# Patient Record
Sex: FEMALE | Race: WHITE | Employment: FULL TIME | URBAN - METROPOLITAN AREA
[De-identification: names, ages, dates, MRNs, and addresses within clinical notes are randomized per-mention and may not be internally consistent; named-entity substitution may affect disease eponyms.]

---

## 2022-03-02 LAB
RHEUMATOID FACTOR: 89.2
SEDIMENTATION RATE, ERYTHROCYTE: 10
URIC ACID: 2.5

## 2022-06-22 LAB
C-REACTIVE PROTEIN: 5
SEDIMENTATION RATE, ERYTHROCYTE: 11

## 2022-08-23 ENCOUNTER — OFFICE VISIT (OUTPATIENT)
Dept: RHEUMATOLOGY | Age: 67
End: 2022-08-23
Payer: COMMERCIAL

## 2022-08-23 ENCOUNTER — NURSE ONLY (OUTPATIENT)
Dept: LAB | Age: 67
End: 2022-08-23

## 2022-08-23 VITALS
HEIGHT: 64 IN | OXYGEN SATURATION: 98 % | DIASTOLIC BLOOD PRESSURE: 78 MMHG | SYSTOLIC BLOOD PRESSURE: 116 MMHG | HEART RATE: 94 BPM | BODY MASS INDEX: 22.94 KG/M2 | WEIGHT: 134.4 LBS

## 2022-08-23 DIAGNOSIS — M05.9 SEROPOSITIVE RHEUMATOID ARTHRITIS (HCC): ICD-10-CM

## 2022-08-23 DIAGNOSIS — Z79.899 ENCOUNTER FOR LONG-TERM (CURRENT) USE OF HIGH-RISK MEDICATION: ICD-10-CM

## 2022-08-23 DIAGNOSIS — M05.9 SEROPOSITIVE RHEUMATOID ARTHRITIS (HCC): Primary | ICD-10-CM

## 2022-08-23 LAB
ALBUMIN SERPL-MCNC: 4.5 G/DL (ref 3.5–5.1)
ALP BLD-CCNC: 125 U/L (ref 38–126)
ALT SERPL-CCNC: 25 U/L (ref 11–66)
ANION GAP SERPL CALCULATED.3IONS-SCNC: 12 MEQ/L (ref 8–16)
AST SERPL-CCNC: 30 U/L (ref 5–40)
BASOPHILS # BLD: 0.7 %
BASOPHILS ABSOLUTE: 0.1 THOU/MM3 (ref 0–0.1)
BILIRUB SERPL-MCNC: 0.3 MG/DL (ref 0.3–1.2)
BUN BLDV-MCNC: 7 MG/DL (ref 7–22)
C-REACTIVE PROTEIN: 0.77 MG/DL (ref 0–1)
CALCIUM SERPL-MCNC: 10 MG/DL (ref 8.5–10.5)
CHLORIDE BLD-SCNC: 101 MEQ/L (ref 98–111)
CO2: 25 MEQ/L (ref 23–33)
CREAT SERPL-MCNC: 0.4 MG/DL (ref 0.4–1.2)
EOSINOPHIL # BLD: 0.8 %
EOSINOPHILS ABSOLUTE: 0.1 THOU/MM3 (ref 0–0.4)
ERYTHROCYTE [DISTWIDTH] IN BLOOD BY AUTOMATED COUNT: 13.2 % (ref 11.5–14.5)
ERYTHROCYTE [DISTWIDTH] IN BLOOD BY AUTOMATED COUNT: 43.1 FL (ref 35–45)
GFR SERPL CREATININE-BSD FRML MDRD: > 90 ML/MIN/1.73M2
GLUCOSE BLD-MCNC: 90 MG/DL (ref 70–108)
HCT VFR BLD CALC: 40.5 % (ref 37–47)
HEMOGLOBIN: 12.9 GM/DL (ref 12–16)
IMMATURE GRANS (ABS): 0.08 THOU/MM3 (ref 0–0.07)
IMMATURE GRANULOCYTES: 0.9 %
LYMPHOCYTES # BLD: 11.1 %
LYMPHOCYTES ABSOLUTE: 1 THOU/MM3 (ref 1–4.8)
MCH RBC QN AUTO: 28.5 PG (ref 26–33)
MCHC RBC AUTO-ENTMCNC: 31.9 GM/DL (ref 32.2–35.5)
MCV RBC AUTO: 89.4 FL (ref 81–99)
MONOCYTES # BLD: 10.5 %
MONOCYTES ABSOLUTE: 0.9 THOU/MM3 (ref 0.4–1.3)
NUCLEATED RED BLOOD CELLS: 0 /100 WBC
PLATELET # BLD: 401 THOU/MM3 (ref 130–400)
PMV BLD AUTO: 9 FL (ref 9.4–12.4)
POTASSIUM SERPL-SCNC: 4.7 MEQ/L (ref 3.5–5.2)
RBC # BLD: 4.53 MILL/MM3 (ref 4.2–5.4)
SEDIMENTATION RATE, ERYTHROCYTE: 14 MM/HR (ref 0–20)
SEG NEUTROPHILS: 76 %
SEGMENTED NEUTROPHILS ABSOLUTE COUNT: 6.5 THOU/MM3 (ref 1.8–7.7)
SODIUM BLD-SCNC: 138 MEQ/L (ref 135–145)
TOTAL PROTEIN: 6.4 G/DL (ref 6.1–8)
WBC # BLD: 8.6 THOU/MM3 (ref 4.8–10.8)

## 2022-08-23 PROCEDURE — G8400 PT W/DXA NO RESULTS DOC: HCPCS | Performed by: INTERNAL MEDICINE

## 2022-08-23 PROCEDURE — G8420 CALC BMI NORM PARAMETERS: HCPCS | Performed by: INTERNAL MEDICINE

## 2022-08-23 PROCEDURE — 3017F COLORECTAL CA SCREEN DOC REV: CPT | Performed by: INTERNAL MEDICINE

## 2022-08-23 PROCEDURE — 1036F TOBACCO NON-USER: CPT | Performed by: INTERNAL MEDICINE

## 2022-08-23 PROCEDURE — 1090F PRES/ABSN URINE INCON ASSESS: CPT | Performed by: INTERNAL MEDICINE

## 2022-08-23 PROCEDURE — G8427 DOCREV CUR MEDS BY ELIG CLIN: HCPCS | Performed by: INTERNAL MEDICINE

## 2022-08-23 PROCEDURE — 99204 OFFICE O/P NEW MOD 45 MIN: CPT | Performed by: INTERNAL MEDICINE

## 2022-08-23 PROCEDURE — 1123F ACP DISCUSS/DSCN MKR DOCD: CPT | Performed by: INTERNAL MEDICINE

## 2022-08-23 RX ORDER — PREDNISONE 1 MG/1
TABLET ORAL
Qty: 42 TABLET | Refills: 0 | Status: SHIPPED | OUTPATIENT
Start: 2022-08-23 | End: 2022-09-13

## 2022-08-23 RX ORDER — IBUPROFEN 200 MG
200 TABLET ORAL EVERY 6 HOURS PRN
COMMUNITY

## 2022-08-23 ASSESSMENT — ENCOUNTER SYMPTOMS
BLOOD IN STOOL: 0
ABDOMINAL PAIN: 0
VOMITING: 0
NAUSEA: 0
WHEEZING: 0
SHORTNESS OF BREATH: 0
COUGH: 0

## 2022-08-23 ASSESSMENT — JOINT PAIN
TOTAL NUMBER OF SWOLLEN JOINTS: 15
TOTAL NUMBER OF TENDER JOINTS: 0

## 2022-08-23 NOTE — PROGRESS NOTES
[address] [facility] Rheumatology  Rheumatology Clinic Note      8/23/2022       Reason for Consult:  RA  Requesting Physician:  Gonzalo Scott MD     CHIEF COMPLAINT:    Chief Complaint   Patient presents with    New Patient     Rheumatiod arthritis         History Obtained From:  patient      HISTORY OF PRESENT ILLNESS:    77 y.o. female presents today for evaluation of rheumatoid arthritis. Was seein rheumtology in 2012 in [facility] for pleurisy. She was diagnosed with RA many years ago. Was prescribed Plaquenil, but did not tolerate it due to migrains. Has not been on any meds since then. Pleurisy improved after going on gluten free diet. Has had joint pain on and off ovre past several years that was tolerable. However, in past few weeks, she started having significant pain in her hands, wrists, neck, knees and feet. Noted some swelling in joints of hands. Has prolonged morning stiffness that lasts for hours. Improves with movement. Past Medical History:     has a past medical history of Rheumatoid arthritis ([facility] [address] Past Surgical History:     has a past surgical history that includes Tonsillectomy and Appendectomy. Current Medications:      Current Outpatient Medications:     ibuprofen (ADVIL;MOTRIN) 200 MG tablet, Take 200 mg by mouth every 6 hours as needed for Pain, Disp: , Rfl:     predniSONE (DELTASONE) 5 MG tablet, Take 3 tablets by mouth daily for 7 days, THEN 2 tablets daily for 7 days, THEN 1 tablet daily for 7 days. , Disp: 42 tablet, Rfl: 0    Allergies:    Codeine and Other    Social History:     reports that she has never smoked. She has never used smokeless tobacco.    Family History:   family history is not on file. REVIEW OF SYSTEMS:    Review of Systems   Constitutional:  Negative for chills, fatigue, fever and unexpected weight change. HENT:  Negative for mouth sores. Respiratory:  Negative for cough, shortness of breath and wheezing.     Cardiovascular: Negative for chest pain and leg swelling. Gastrointestinal:  Negative for abdominal pain, blood in stool, nausea and vomiting. Genitourinary:  Negative for dysuria and hematuria. Skin:  Negative for rash. Neurological:  Negative for headaches. PHYSICAL EXAM:    Vitals:    /78 (Site: Left Upper Arm, Position: Sitting, Cuff Size: Large Adult)   Pulse 94   Ht 5' 4\" (1.626 m)   Wt 134 lb 6.4 oz (61 kg)   SpO2 98%   BMI 23.07 kg/m²     Physical Exam  Constitutional:       General: She is not in acute distress. Appearance: Normal appearance. HENT:      Mouth/Throat:      Mouth: Mucous membranes are moist.      Pharynx: Oropharynx is clear. Eyes:      Extraocular Movements: Extraocular movements intact. Conjunctiva/sclera: Conjunctivae normal.   Cardiovascular:      Rate and Rhythm: Normal rate and regular rhythm. Heart sounds: Normal heart sounds. No murmur heard. No friction rub. Pulmonary:      Effort: Pulmonary effort is normal. No respiratory distress. Breath sounds: Normal breath sounds. No wheezing or rhonchi. Abdominal:      Palpations: Abdomen is soft. Musculoskeletal:         General: Swelling and deformity present. No tenderness. Normal range of motion. Cervical back: Normal range of motion and neck supple. Right lower leg: No edema. Left lower leg: No edema. Comments: Z-doformity in first thumb bilateral.  Deormities in toes. Moderate synovitis with no tenderness as elicited on image below. Lymphadenopathy:      Cervical: No cervical adenopathy. Skin:     General: Skin is warm and dry. Findings: No lesion or rash. Neurological:      General: No focal deficit present. Mental Status: She is alert and oriented to person, place, and time. Mental status is at baseline. Cranial Nerves: No cranial nerve deficit.       Gait: Gait normal.   Psychiatric:         Mood and Affect: Mood normal.          Physical Exam        RODGERS-28 (ESR): 2.8 (Low disease activity)  RODGERS-28 (CRP): 2.73 (Low disease activity)         DATA:    Labs were reviewed. Most Recent   URIC ACID,URA 2.5 (E)  3/2/22 00:00   CRP 5 (E)  6/22/22 00:00   Sed Rate 11 (E)  6/22/22 00:00   Rheumatoid Factor 89.2 (E)  3/2/22 00:00   (E): External lab result      Imaging was reviewed. IMPRESSION/RECOMMENDATIONS:      1. Seropositive rheumatoid arthritis  -- plan to start methotrexate 12.5 mg once weekly and folic acid 1 mg daily after review of labs. Discussed with pt the benefits, risks and adverse effects of this medication. Patient expressed understanding.  -- prednisone taper: 15 mg daily x 1wk, then 10 mg daily x 1wk, then 5 mg daily x 1wk then stop.     2. High risk med requiring monitoring  -- MTX: lmonitor CBC and CMP abs every 4-5 weeks for first 3 months then every 3 months    RTC in 4 weeks    Orders Placed This Encounter   Procedures    CBC with Auto Differential     Standing Status:   Future     Standing Expiration Date:   2/23/2023    Comprehensive Metabolic Panel     Standing Status:   Future     Standing Expiration Date:   2/23/2023    Sedimentation Rate     Standing Status:   Future     Standing Expiration Date:   2/23/2023    C-Reactive Protein     Standing Status:   Future     Standing Expiration Date:   2/23/2023    CBC with Auto Differential     Standing Status:   Future     Standing Expiration Date:   2/23/2023    Comprehensive Metabolic Panel     Standing Status:   Future     Standing Expiration Date:   2/23/2023    Sedimentation Rate     Standing Status:   Future     Standing Expiration Date:   2/23/2023    C-Reactive Protein     Standing Status:   Future     Standing Expiration Date:   0/37/4305    Cyclic Citrul Peptide Antibody, IgG     Standing Status:   Future     Standing Expiration Date:   2/23/2023          Maurice Roland MD    [address] [place] [id number] [phone]

## 2022-08-24 ENCOUNTER — TELEPHONE (OUTPATIENT)
Dept: RHEUMATOLOGY | Age: 67
End: 2022-08-24

## 2022-08-24 DIAGNOSIS — Z51.81 MEDICATION MONITORING ENCOUNTER: Primary | ICD-10-CM

## 2022-08-24 RX ORDER — FOLIC ACID 1 MG/1
1 TABLET ORAL DAILY
Qty: 90 TABLET | Refills: 1 | Status: SHIPPED | OUTPATIENT
Start: 2022-08-24 | End: 2022-09-27 | Stop reason: SDUPTHER

## 2022-09-27 ENCOUNTER — OFFICE VISIT (OUTPATIENT)
Dept: RHEUMATOLOGY | Age: 67
End: 2022-09-27
Payer: COMMERCIAL

## 2022-09-27 ENCOUNTER — NURSE ONLY (OUTPATIENT)
Dept: LAB | Age: 67
End: 2022-09-27

## 2022-09-27 VITALS
HEIGHT: 55 IN | BODY MASS INDEX: 32.17 KG/M2 | DIASTOLIC BLOOD PRESSURE: 74 MMHG | SYSTOLIC BLOOD PRESSURE: 118 MMHG | HEART RATE: 84 BPM | OXYGEN SATURATION: 98 % | WEIGHT: 139 LBS

## 2022-09-27 DIAGNOSIS — M05.9 SEROPOSITIVE RHEUMATOID ARTHRITIS (HCC): ICD-10-CM

## 2022-09-27 DIAGNOSIS — M05.9 SEROPOSITIVE RHEUMATOID ARTHRITIS (HCC): Primary | ICD-10-CM

## 2022-09-27 DIAGNOSIS — Z11.59 NEED FOR HEPATITIS B SCREENING TEST: ICD-10-CM

## 2022-09-27 DIAGNOSIS — Z79.899 ENCOUNTER FOR LONG-TERM (CURRENT) USE OF HIGH-RISK MEDICATION: ICD-10-CM

## 2022-09-27 DIAGNOSIS — Z11.1 SCREENING-PULMONARY TB: ICD-10-CM

## 2022-09-27 LAB
ALBUMIN SERPL-MCNC: 4 G/DL (ref 3.5–5.1)
ALP BLD-CCNC: 108 U/L (ref 38–126)
ALT SERPL-CCNC: 26 U/L (ref 11–66)
ANION GAP SERPL CALCULATED.3IONS-SCNC: 12 MEQ/L (ref 8–16)
AST SERPL-CCNC: 31 U/L (ref 5–40)
BASOPHILS # BLD: 0.4 %
BASOPHILS ABSOLUTE: 0 THOU/MM3 (ref 0–0.1)
BILIRUB SERPL-MCNC: 0.4 MG/DL (ref 0.3–1.2)
BUN BLDV-MCNC: 7 MG/DL (ref 7–22)
C-REACTIVE PROTEIN: 0.58 MG/DL (ref 0–1)
CALCIUM SERPL-MCNC: 9.5 MG/DL (ref 8.5–10.5)
CHLORIDE BLD-SCNC: 100 MEQ/L (ref 98–111)
CO2: 24 MEQ/L (ref 23–33)
CREAT SERPL-MCNC: 0.4 MG/DL (ref 0.4–1.2)
EOSINOPHIL # BLD: 0.9 %
EOSINOPHILS ABSOLUTE: 0.1 THOU/MM3 (ref 0–0.4)
ERYTHROCYTE [DISTWIDTH] IN BLOOD BY AUTOMATED COUNT: 13.4 % (ref 11.5–14.5)
ERYTHROCYTE [DISTWIDTH] IN BLOOD BY AUTOMATED COUNT: 42 FL (ref 35–45)
GFR SERPL CREATININE-BSD FRML MDRD: > 90 ML/MIN/1.73M2
GLUCOSE BLD-MCNC: 87 MG/DL (ref 70–108)
HAV IGM SER IA-ACNC: NEGATIVE
HCT VFR BLD CALC: 37 % (ref 37–47)
HEMOGLOBIN: 12.2 GM/DL (ref 12–16)
HEPATITIS B CORE IGM ANTIBODY: NEGATIVE
HEPATITIS B SURFACE ANTIGEN: NEGATIVE
HEPATITIS C ANTIBODY: NEGATIVE
IMMATURE GRANS (ABS): 0.03 THOU/MM3 (ref 0–0.07)
IMMATURE GRANULOCYTES: 0.4 %
LYMPHOCYTES # BLD: 10.9 %
LYMPHOCYTES ABSOLUTE: 0.9 THOU/MM3 (ref 1–4.8)
MCH RBC QN AUTO: 28.4 PG (ref 26–33)
MCHC RBC AUTO-ENTMCNC: 33 GM/DL (ref 32.2–35.5)
MCV RBC AUTO: 86 FL (ref 81–99)
MONOCYTES # BLD: 7.8 %
MONOCYTES ABSOLUTE: 0.6 THOU/MM3 (ref 0.4–1.3)
NUCLEATED RED BLOOD CELLS: 0 /100 WBC
PLATELET # BLD: 423 THOU/MM3 (ref 130–400)
PMV BLD AUTO: 9 FL (ref 9.4–12.4)
POTASSIUM SERPL-SCNC: 4.2 MEQ/L (ref 3.5–5.2)
RBC # BLD: 4.3 MILL/MM3 (ref 4.2–5.4)
SEDIMENTATION RATE, ERYTHROCYTE: 16 MM/HR (ref 0–20)
SEG NEUTROPHILS: 79.6 %
SEGMENTED NEUTROPHILS ABSOLUTE COUNT: 6.4 THOU/MM3 (ref 1.8–7.7)
SODIUM BLD-SCNC: 136 MEQ/L (ref 135–145)
TOTAL PROTEIN: 6.4 G/DL (ref 6.1–8)
WBC # BLD: 8.1 THOU/MM3 (ref 4.8–10.8)

## 2022-09-27 PROCEDURE — 1123F ACP DISCUSS/DSCN MKR DOCD: CPT | Performed by: INTERNAL MEDICINE

## 2022-09-27 PROCEDURE — 99214 OFFICE O/P EST MOD 30 MIN: CPT | Performed by: INTERNAL MEDICINE

## 2022-09-27 RX ORDER — PREDNISONE 1 MG/1
TABLET ORAL
Qty: 42 TABLET | Refills: 0 | Status: SHIPPED | OUTPATIENT
Start: 2022-09-27 | End: 2022-10-18

## 2022-09-27 RX ORDER — FOLIC ACID 1 MG/1
2 TABLET ORAL DAILY
Qty: 60 TABLET | Refills: 1 | Status: SHIPPED | OUTPATIENT
Start: 2022-09-27 | End: 2022-11-01 | Stop reason: SDUPTHER

## 2022-09-27 ASSESSMENT — ENCOUNTER SYMPTOMS
COUGH: 0
NAUSEA: 0
ABDOMINAL PAIN: 0
VOMITING: 0
WHEEZING: 0
SHORTNESS OF BREATH: 0

## 2022-09-27 ASSESSMENT — JOINT PAIN
TOTAL NUMBER OF SWOLLEN JOINTS: 19
TOTAL NUMBER OF TENDER JOINTS: 4

## 2022-09-27 NOTE — PROGRESS NOTES
[facility]) Physicians Rheumatology  Rheumatology Clinic Note      9/27/2022       CHIEF COMPLAINT:    Chief Complaint   Patient presents with    Follow-up     4 week  hands knees feet neck         HISTORY OF PRESENT ILLNESS:    77 y.o. female with seroporitive rheumatoid arthritis (positive RF) presents for follow up. When seen last, she was started on methotrexate 29.8 mg weekly, folic acid 1 mg daily and taper prednsione course. Past med: hydroxychloroquine (migraines)    Reports being on methotrexate for 2 weeks. She started it after completing the prednsione course. Reports that she was doing really well on prednisone, then her joint pain worsen thereafter. So far, tolerating MTX. Pain is most pronounced in her hands, knees and feet. Associated with joint swelling and weakness. Dropping objects. Has decreased . Prolonged morning stiffness. Past Medical History:     has a past medical history of Rheumatoid arthritis ([facility] [address] Past Surgical History:     has a past surgical history that includes Tonsillectomy and Appendectomy. Current Medications:      Current Outpatient Medications:     folic acid (FOLVITE) 1 MG tablet, Take 2 tablets by mouth daily, Disp: 60 tablet, Rfl: 1    methotrexate (RHEUMATREX) 2.5 MG chemo tablet, Take 7 tablets by mouth once a week, Disp: 28 tablet, Rfl: 1    predniSONE (DELTASONE) 5 MG tablet, Take 3 tablets by mouth daily for 7 days, THEN 2 tablets daily for 7 days, THEN 1 tablet daily for 7 days. , Disp: 42 tablet, Rfl: 0    ibuprofen (ADVIL;MOTRIN) 200 MG tablet, Take 200 mg by mouth every 6 hours as needed for Pain, Disp: , Rfl:     Allergies:    Codeine and Other    Social History:     reports that she has never smoked. She has never used smokeless tobacco.    Family History:   family history is not on file. REVIEW OF SYSTEMS:    Review of Systems   Constitutional:  Negative for chills, fatigue and fever. HENT:  Negative for mouth sores.     Respiratory: Negative for cough, shortness of breath and wheezing. Cardiovascular:  Negative for chest pain. Gastrointestinal:  Negative for abdominal pain, nausea and vomiting. Skin:  Negative for rash. PHYSICAL EXAM:    Vitals:    /74 (Site: Left Upper Arm, Position: Sitting, Cuff Size: Medium Adult)   Pulse 84   Ht 1' 7.51\" (0.496 m)   Wt 139 lb (63 kg)   SpO2 98%   .69 kg/m²     Physical Exam  Constitutional:       General: She is not in acute distress. Appearance: Normal appearance. HENT:      Mouth/Throat:      Mouth: Mucous membranes are moist.      Pharynx: Oropharynx is clear. Eyes:      Conjunctiva/sclera: Conjunctivae normal.   Pulmonary:      Effort: Pulmonary effort is normal.   Musculoskeletal:         General: Swelling, tenderness and deformity present. Normal range of motion. Cervical back: Normal range of motion and neck supple. Right lower leg: No edema. Left lower leg: No edema. Comments: Z-doformity in first thumb bilateral.  Deormities in toes. Moderate synovitis with tenderness as elicited on image below. Skin:     General: Skin is warm and dry. Findings: No lesion or rash. Neurological:      General: No focal deficit present. Mental Status: She is alert and oriented to person, place, and time.       Gait: Gait normal.   Psychiatric:         Mood and Affect: Mood normal.          Physical Exam        RODGERS-28 (ESR): 4.3 (Moderate disease activity)  RODGERS-28 (CRP): 4.19 (Moderate disease activity)         DATA:     Latest Reference Range & Units 8/23/22 09:57   Sodium 135 - 145 meq/L 138   Potassium 3.5 - 5.2 meq/L 4.7   Chloride 98 - 111 meq/L 101   CO2 23 - 33 meq/L 25   BUN,BUNPL 7 - 22 mg/dL 7   Creatinine 0.4 - 1.2 mg/dL 0.4   Anion Gap 8.0 - 16.0 meq/L 12.0   Est, Glom Filt Rate ml/min/1.73m2 >90   Glucose, Random 70 - 108 mg/dL 90   CALCIUM, SERUM, 478114 8.5 - 10.5 mg/dL 10.0   Total Protein 6.1 - 8.0 g/dL 6.4   CRP 0.00 - 1.00 mg/dl 0.77   Albumin 3.5 - 5.1 g/dL 4.5   Alk Phos 38 - 126 U/L 125   ALT 11 - 66 U/L 25   AST 5 - 40 U/L 30   Bilirubin 0.3 - 1.2 mg/dL 0.3      Latest Reference Range & Units 8/23/22 09:57   WBC 4.8 - 10.8 thou/mm3 8.6   RBC 4.20 - 5.40 mill/mm3 4.53   Hemoglobin Quant 12.0 - 16.0 gm/dl 12.9   Hematocrit 37.0 - 47.0 % 40.5   MCV 81.0 - 99.0 fL 89.4   MCH 26.0 - 33.0 pg 28.5   MCHC 32.2 - 35.5 gm/dl 31.9 (L)   MPV 9.4 - 12.4 fL 9.0 (L)   RDW-CV 11.5 - 14.5 % 13.2   RDW-SD 35.0 - 45.0 fL 43.1   Platelet Count 343 - 400 thou/mm3 401 (H)      3/2/22 00:00   Rheumatoid Factor 89.2 (E)   (E): External lab result      IMPRESSION/RECOMMENDATIONS:      1. Seropositive rheumatoid arthritis (postive RF), chornic condition with moderate disease activity  -- too soon to assess MTX efficacy. She has taken 2 doses so far with third dose scheduled today  -- increase MTX to 17.5 mg PO weekly. Increase folic acid to 2 g daily except on days she takes MTX (pt is reporting stomatitis/mucositis symptoms)  -- prednisone taper: 15 mg daily x 1wk, then 10 mg daily x 1wk, then 5 mg daily x 1wk then stop (as bridging therapy)  -- if no improvement by next visit, would consider starting a biologic therapy    2. High risk med requiring monitoring  -- MTX: no recent labs since starting MTX. Obtain labs today to monitor MTX side effects (CMP, CBC)    3.  Assess QFT TB and Hep B serologies for possible need [facility]    RTC in 4 weeks    Orders Placed This Encounter   Procedures    Quantiferon TB Gold     Standing Status:   Future     Standing Expiration Date:   3/27/2023    CBC with Auto Differential     Standing Status:   Future     Standing Expiration Date:   3/27/2023    Comprehensive Metabolic Panel     Standing Status:   Future     Standing Expiration Date:   3/27/2023    Sedimentation Rate     Standing Status:   Future     Standing Expiration Date:   3/27/2023    C-Reactive Protein     Standing Status:   Future Standing Expiration Date:   3/27/2023    Hepatitis Panel, Acute     Standing Status:   Future     Standing Expiration Date:   3/27/2023    CBC with Auto Differential     Standing Status:   Future     Standing Expiration Date:   3/27/2023    Comprehensive Metabolic Panel     Standing Status:   Future     Standing Expiration Date:   9/27/2023    C-Reactive Protein     Standing Status:   Future     Standing Expiration Date:   9/27/2023    Sedimentation Rate     Standing Status:   Future     Standing Expiration Date:   9/27/2023            Twin Cosme MD    [address] [place] [address]  [address] [facility]. [address]  [phone]

## 2022-10-04 LAB
QUANTI TB GOLD PLUS: NEGATIVE
QUANTI TB1 MINUS NIL: 0.01 IU/ML (ref 0–0.34)
QUANTI TB2 MINUS NIL: 0.01 IU/ML (ref 0–0.34)
QUANTIFERON MITOGEN MINUS NIL: > 10 IU/ML
QUANTIFERON NIL: 0.02 IU/ML

## 2022-10-21 LAB
ALBUMIN SERPL-MCNC: 4.06 G/DL
ALP BLD-CCNC: 85 U/L
ALT SERPL-CCNC: 19 U/L
ANION GAP SERPL CALCULATED.3IONS-SCNC: 15 MMOL/L
AST SERPL-CCNC: 29 U/L
BASOPHILS ABSOLUTE: 0.03 /ΜL
BASOPHILS RELATIVE PERCENT: 0.5 %
BILIRUB SERPL-MCNC: 0.46 MG/DL (ref 0.1–1.4)
BUN BLDV-MCNC: 8.5 MG/DL
C-REACTIVE PROTEIN: 3.2
CALCIUM SERPL-MCNC: 9.4 MG/DL
CHLORIDE BLD-SCNC: 102.1 MMOL/L
CO2: 24.9 MMOL/L
CREAT SERPL-MCNC: 0.58 MG/DL
EOSINOPHILS ABSOLUTE: 0.07 /ΜL
EOSINOPHILS RELATIVE PERCENT: 1.2 %
GFR CALCULATED: 60
GLUCOSE BLD-MCNC: 85 MG/DL
HCT VFR BLD CALC: 36.9 % (ref 36–46)
HEMOGLOBIN: 11.6 G/DL (ref 12–16)
LYMPHOCYTES ABSOLUTE: 0.84 /ΜL
LYMPHOCYTES RELATIVE PERCENT: 14.6 %
MCH RBC QN AUTO: 28.1 PG
MCHC RBC AUTO-ENTMCNC: 31.4 G/DL
MCV RBC AUTO: 89 FL
MONOCYTES ABSOLUTE: 0.6 /ΜL
MONOCYTES RELATIVE PERCENT: 10.4 %
NEUTROPHILS ABSOLUTE: 4.21 /ΜL
NEUTROPHILS RELATIVE PERCENT: 73.1 %
PDW BLD-RTO: 14.1 %
PLATELET # BLD: 281 K/ΜL
PMV BLD AUTO: ABNORMAL FL
POTASSIUM SERPL-SCNC: 4.23 MMOL/L
QUANTI TB GOLD PLUS: NEGATIVE
QUANTI TB1 MINUS NIL: NORMAL
QUANTI TB2 MINUS NIL: NORMAL
QUANTIFERON MITOGEN: NORMAL
QUANTIFERON NIL: NORMAL
RBC # BLD: 4.13 10^6/ΜL
SEDIMENTATION RATE, ERYTHROCYTE: 33
SODIUM BLD-SCNC: 138 MMOL/L
TOTAL PROTEIN: 6.1
WBC # BLD: 5.76 10^3/ML

## 2022-11-01 ENCOUNTER — OFFICE VISIT (OUTPATIENT)
Dept: RHEUMATOLOGY | Age: 67
End: 2022-11-01
Payer: COMMERCIAL

## 2022-11-01 VITALS
HEART RATE: 76 BPM | WEIGHT: 140 LBS | BODY MASS INDEX: 23.9 KG/M2 | SYSTOLIC BLOOD PRESSURE: 114 MMHG | HEIGHT: 64 IN | OXYGEN SATURATION: 98 % | DIASTOLIC BLOOD PRESSURE: 80 MMHG

## 2022-11-01 DIAGNOSIS — M05.9 SEROPOSITIVE RHEUMATOID ARTHRITIS (HCC): Primary | ICD-10-CM

## 2022-11-01 DIAGNOSIS — Z79.899 ENCOUNTER FOR LONG-TERM (CURRENT) USE OF HIGH-RISK MEDICATION: ICD-10-CM

## 2022-11-01 PROCEDURE — 1123F ACP DISCUSS/DSCN MKR DOCD: CPT | Performed by: INTERNAL MEDICINE

## 2022-11-01 PROCEDURE — 99214 OFFICE O/P EST MOD 30 MIN: CPT | Performed by: INTERNAL MEDICINE

## 2022-11-01 RX ORDER — ETANERCEPT 50 MG/ML
50 SOLUTION SUBCUTANEOUS WEEKLY
Qty: 4 EACH | Refills: 5 | Status: ACTIVE | OUTPATIENT
Start: 2022-11-01 | End: 2022-11-29

## 2022-11-01 RX ORDER — FOLIC ACID 1 MG/1
2 TABLET ORAL DAILY
Qty: 60 TABLET | Refills: 11 | Status: SHIPPED | OUTPATIENT
Start: 2022-11-01 | End: 2022-12-01

## 2022-11-01 ASSESSMENT — JOINT PAIN
TOTAL NUMBER OF TENDER JOINTS: 3
TOTAL NUMBER OF SWOLLEN JOINTS: 12

## 2022-11-01 ASSESSMENT — ENCOUNTER SYMPTOMS
VOMITING: 0
NAUSEA: 0
COUGH: 0
ABDOMINAL PAIN: 0
SHORTNESS OF BREATH: 0

## 2022-11-01 NOTE — PROGRESS NOTES
[facility]) Physicians Rheumatology  Rheumatology Clinic Note      11/1/2022       CHIEF COMPLAINT:    Chief Complaint   Patient presents with    Follow-up     4 wk f/u, Seropositive rheumatoid arthritis ([facility]         HISTORY OF PRESENT ILLNESS:    77 y.o. female with seroporitive rheumatoid arthritis (positive RF) presents for follow up. She is on methotrexate 17.5 mg weekly (increased last visit), folic acid 1 mg daily. Past med: hydroxychloroquine (migraines)    Reports improvement in her joint pain with methotrexate. However, continues to have prolonged morning stiffness that lasts for over an hour. She continues to take Advil every morning to help with morning stiffness and pain in hands. Pain is most pronounced in her thumbs and wrists. Repetitive movements aggravates the pain. Overall improvement with MTX. Past Medical History:     has a past medical history of Rheumatoid arthritis ([facility] [address] Past Surgical History:     has a past surgical history that includes Tonsillectomy and Appendectomy. Current Medications:      Current Outpatient Medications:     Etanercept (ENBREL SURECLICK) 50 MG/ML SOAJ, Inject 50 mg into the skin once a week for 28 days, Disp: 4 each, Rfl: 5    methotrexate (RHEUMATREX) 2.5 MG chemo tablet, Take 7 tablets by mouth once a week for 28 days Take 2.5 mg by mouth once a week, Disp: 28 tablet, Rfl: 2    folic acid (FOLVITE) 1 MG tablet, Take 2 tablets by mouth daily, Disp: 60 tablet, Rfl: 11    ibuprofen (ADVIL;MOTRIN) 200 MG tablet, Take 200 mg by mouth every 6 hours as needed for Pain, Disp: , Rfl:     Allergies:    Codeine and Other    Social History:     reports that she has never smoked. She has never used smokeless tobacco.    Family History:   family history is not on file. REVIEW OF SYSTEMS:    Review of Systems   Constitutional:  Negative for chills and fever. Respiratory:  Negative for cough and shortness of breath.     Cardiovascular:  Negative for chest pain.   Gastrointestinal:  Negative for abdominal pain, nausea and vomiting. Skin:  Negative for rash. PHYSICAL EXAM:    Vitals:    /80 (Site: Left Upper Arm, Position: Sitting, Cuff Size: Large Adult)   Pulse 76   Ht 5' 4\" (1.626 m)   Wt 140 lb (63.5 kg)   SpO2 98%   BMI 24.03 kg/m²     Physical Exam  Constitutional:       General: She is not in acute distress. Appearance: Normal appearance. Eyes:      Conjunctiva/sclera: Conjunctivae normal.   Pulmonary:      Effort: Pulmonary effort is normal.   Musculoskeletal:         General: Swelling, tenderness and deformity present. Normal range of motion. Cervical back: Normal range of motion and neck supple. Comments: Z-doformity in first thumb bilateral.  Deormities in toes. Moderate synovitis with tenderness as elicited on image below. Skin:     General: Skin is warm and dry. Findings: No lesion or rash. Neurological:      General: No focal deficit present. Mental Status: She is alert and oriented to person, place, and time.       Gait: Gait normal.   Psychiatric:         Mood and Affect: Mood normal.        Physical Exam        RODGERS-28 (ESR): 4.46 (Moderate disease activity)  RODGERS-28 (CRP): 3.49 (Moderate disease activity)         DATA:     Latest Reference Range & Units 10/21/22 00:00   Sodium mmol/L 138 (E)   Potassium mmol/L 4.23 (E)   Chloride mmol/L 102.1 (E)   CO2 mmol/L 24.9 (E)   BUN,BUNPL mg/dL 8.5 (E)   Creatinine  0.58 (E)   Anion Gap mmol/L 15 (E)   Gfr Calculated  60.0 (E)   Glucose, Random mg/dL 85 (E)   CALCIUM, SERUM, [id number] mg/dL 9.4 (E)   Total Protein  6.1 (E)      10/21/22 00:00   CRP 3.2 (E)      Latest Reference Range & Units 10/21/22 00:00   Albumin  4.06 (E)   Alk Phos U/L 85 (E)   ALT U/L 19 (E)   AST U/L 29 (E)   Bilirubin 0.1 - 1.4 mg/dL 0.46 (E)   Total Protein  6.1 (E)      Latest Reference Range & Units 10/21/22 00:00   WBC 10^3/mL 5.76 (E)   RBC 10^6/µL 4.13 (E)   Hemoglobin Quant 12.0 - 16.0 g/dL 11.6 ! (E)   Hematocrit 36 - 46 % 36.9 (E)   MCV fL 89 (E)   MCH pg 28.1 (E)   MCHC g/dL 31.4 (E)   RDW % 14.1 (E)   Platelet Count K/µL 004 (E)      10/21/22 00:00   Sed Rate 33 (E)      10/21/22 00:00   Quantiferon TB Minus NIL NEGATIVE (E)      9/27/22 09:39   Hep A IgM Negative   Hepatitis B Surface Ag Negative   Hepatitis C Ab Negative   Hep B Core Ab, IgM Negative           IMPRESSION/RECOMMENDATIONS:      1. Seropositive rheumatoid arthritis (postive RF), chornic condition with improving disease activity with recent medication adjustments. Continues to have synovitis and prolonged morning stiffness. -- discussed with pt treatment options. She agreed to start Enbrel 50 mg SQ once weekly. Discussed with pt the benefits, risks and adverse effects of this medication. Patient expressed understanding.  -- continue methotrexate 17.5 mg PO weekly and folic acid 1 mg daily. 2. High risk med requiring monitoring  -- MTX: reviewed with pt her lab results.  Need to monitor CBC and CMP q 4-6 weeks      RTC in 6 weeks      Orders Placed This Encounter   Procedures    CBC with Auto Differential     Standing Status:   Future     Standing Expiration Date:   5/1/2023    Comprehensive Metabolic Panel     Standing Status:   Future     Standing Expiration Date:   11/1/2023    C-Reactive Protein     Standing Status:   Future     Standing Expiration Date:   11/1/2023    Sedimentation Rate     Standing Status:   Future     Standing Expiration Date:   11/1/2023            Sujatha Foote MD    [address] [facility]  [address] [facility]. [address]  [phone]

## 2022-12-02 LAB
ALBUMIN SERPL-MCNC: 4.44 G/DL
ALP BLD-CCNC: 98 U/L
ALT SERPL-CCNC: 31 U/L
ANION GAP SERPL CALCULATED.3IONS-SCNC: 14 MMOL/L
AST SERPL-CCNC: 22 U/L
BASOPHILS ABSOLUTE: 0.05 /ΜL
BASOPHILS RELATIVE PERCENT: 0.9 %
BILIRUB SERPL-MCNC: 0.54 MG/DL (ref 0.1–1.4)
BUN BLDV-MCNC: 8.9 MG/DL
C-REACTIVE PROTEIN: 3
CALCIUM SERPL-MCNC: 9.5 MG/DL
CHLORIDE BLD-SCNC: 105.7 MMOL/L
CO2: 25.7 MMOL/L
CREAT SERPL-MCNC: 0.64 MG/DL
EOSINOPHILS ABSOLUTE: 0.08 /ΜL
EOSINOPHILS RELATIVE PERCENT: 1.4 %
GFR CALCULATED: 60
GLUCOSE BLD-MCNC: 89 MG/DL
HCT VFR BLD CALC: 40.4 % (ref 36–46)
HEMOGLOBIN: 12.7 G/DL (ref 12–16)
LYMPHOCYTES ABSOLUTE: 1.13 /ΜL
LYMPHOCYTES RELATIVE PERCENT: 19.4 %
MCH RBC QN AUTO: 27.8 PG
MCHC RBC AUTO-ENTMCNC: 31.4 G/DL
MCV RBC AUTO: 88 FL
MONOCYTES ABSOLUTE: 0.74 /ΜL
MONOCYTES RELATIVE PERCENT: 12.7 %
NEUTROPHILS ABSOLUTE: 3.8 /ΜL
NEUTROPHILS RELATIVE PERCENT: 65.4 %
PDW BLD-RTO: 15 %
PLATELET # BLD: 256 K/ΜL
PMV BLD AUTO: NORMAL FL
POTASSIUM SERPL-SCNC: 4.59 MMOL/L
RBC # BLD: 4.57 10^6/ΜL
SEDIMENTATION RATE, ERYTHROCYTE: 5
SODIUM BLD-SCNC: 141 MMOL/L
TOTAL PROTEIN: 6.5
WBC # BLD: 5.81 10^3/ML

## 2022-12-14 ENCOUNTER — OFFICE VISIT (OUTPATIENT)
Dept: RHEUMATOLOGY | Age: 67
End: 2022-12-14
Payer: COMMERCIAL

## 2022-12-14 VITALS
BODY MASS INDEX: 23.9 KG/M2 | HEART RATE: 81 BPM | HEIGHT: 64 IN | DIASTOLIC BLOOD PRESSURE: 68 MMHG | OXYGEN SATURATION: 98 % | SYSTOLIC BLOOD PRESSURE: 110 MMHG | WEIGHT: 139.99 LBS

## 2022-12-14 DIAGNOSIS — M05.9 SEROPOSITIVE RHEUMATOID ARTHRITIS (HCC): ICD-10-CM

## 2022-12-14 DIAGNOSIS — Z79.899 ENCOUNTER FOR LONG-TERM (CURRENT) USE OF HIGH-RISK MEDICATION: ICD-10-CM

## 2022-12-14 PROCEDURE — 99214 OFFICE O/P EST MOD 30 MIN: CPT | Performed by: INTERNAL MEDICINE

## 2022-12-14 PROCEDURE — 1123F ACP DISCUSS/DSCN MKR DOCD: CPT | Performed by: INTERNAL MEDICINE

## 2022-12-14 RX ORDER — FOLIC ACID 1 MG/1
2 TABLET ORAL DAILY
Qty: 60 TABLET | Refills: 11 | Status: SHIPPED | OUTPATIENT
Start: 2022-12-14 | End: 2023-01-13

## 2022-12-14 ASSESSMENT — ENCOUNTER SYMPTOMS
COUGH: 0
VOMITING: 0
SHORTNESS OF BREATH: 0
NAUSEA: 0
ABDOMINAL PAIN: 0

## 2022-12-14 NOTE — PROGRESS NOTES
[facility]) Physicians Rheumatology  Rheumatology Clinic Note      12/14/2022      CHIEF COMPLAINT:    Chief Complaint   Patient presents with    Follow-up     6 wk f/u, Seropositive rheumatoid arthritis ([facility] [address]    Pt stated pain 0.5/10 thumbs on bilateral hands. States that she didn't get a sharps container when received enbrel and was wondering what she is supposed to do when she is done with them - brought old one in with her as she is not sure how to dispose of them. HISTORY OF PRESENT ILLNESS:    79 y.o. female with seroporitive rheumatoid arthritis (positive RF) presents for follow up. She is on methotrexate 32.9 mg weekly, folic acid 1 mg daily and Enbrel 50 mg SQ once weekly (started Nov 2022)  Past med: hydroxychloroquine (migraines)    Took 5 doses on Enbrel so far. She has noticed significant improvement in her joint pain. Denies any present joint pain, joint swelling or prolonged morning stiffness. Past Medical History:     has a past medical history of Rheumatoid arthritis ([facility] [address] Past Surgical History:     has a past surgical history that includes Tonsillectomy and Appendectomy. Current Medications:      Current Outpatient Medications:     vitamin D 25 MCG (1000 UT) CAPS, Take by mouth daily, Disp: , Rfl:     folic acid (FOLVITE) 1 MG tablet, Take 2 tablets by mouth daily, Disp: 60 tablet, Rfl: 11    methotrexate (RHEUMATREX) 2.5 MG chemo tablet, Take 7 tablets by mouth once a week for 28 days Take 2.5 mg by mouth once a week, Disp: 28 tablet, Rfl: 2    ibuprofen (ADVIL;MOTRIN) 200 MG tablet, Take 200 mg by mouth every 6 hours as needed for Pain, Disp: , Rfl:     Etanercept (ENBREL SURECLICK) 50 MG/ML SOAJ, Inject 50 mg into the skin once a week for 28 days, Disp: 4 each, Rfl: 5    Allergies:    Codeine and Other    Social History:     reports that she has never smoked. She has never used smokeless tobacco.    Family History:   family history is not on file.       REVIEW OF SYSTEMS:    Review of Systems   Constitutional:  Negative for chills and fever. Respiratory:  Negative for cough and shortness of breath. Cardiovascular:  Negative for chest pain. Gastrointestinal:  Negative for abdominal pain, nausea and vomiting. Skin:  Negative for rash. PHYSICAL EXAM:    Vitals:    /68 (Site: Left Upper Arm, Position: Sitting, Cuff Size: Large Adult)   Pulse 81   Ht 5' 4.02\" (1.626 m)   Wt 139 lb 15.9 oz (63.5 kg)   SpO2 98%   BMI 24.02 kg/m²     Physical Exam  Constitutional:       General: She is not in acute distress. Appearance: Normal appearance. Eyes:      Conjunctiva/sclera: Conjunctivae normal.   Pulmonary:      Effort: Pulmonary effort is normal.   Musculoskeletal:         General: Deformity present. No swelling or tenderness. Normal range of motion. Cervical back: Neck supple. Comments: Z-doformity in first thumb bilateral.   Skin:     General: Skin is warm and dry. Findings: No lesion or rash. Neurological:      General: No focal deficit present. Mental Status: She is alert and oriented to person, place, and time.       Gait: Gait normal.   Psychiatric:         Mood and Affect: Mood normal.        Physical Exam        RODGERS-28 (ESR): 1.14 (Remission)  RODGERS-28 (CRP): 1.47 (Remission)         DATA:     Latest Reference Range & Units 12/2/22 00:00   Sodium mmol/L 141 (E)   Potassium mmol/L 4.59 (E)   Chloride mmol/L 105.7 (E)   CO2 mmol/L 25.7 (E)   BUN,BUNPL mg/dL 8.9 (E)   Creatinine  0.64 (E)   Anion Gap mmol/L 14 (E)   Gfr Calculated  60.0 (E)   Glucose, Random mg/dL 89 (E)   CALCIUM, SERUM, [id number] mg/dL 9.5 (E)   Total Protein  6.5 (E)      12/2/22 00:00   CRP 3.0 (E)      Latest Reference Range & Units 12/2/22 00:00   Albumin  4.44 (E)   Alk Phos U/L 98 (E)   ALT U/L 31 (E)   AST U/L 22 (E)   Bilirubin 0.1 - 1.4 mg/dL 0.54 (E)   Total Protein  6.5 (E)      Latest Reference Range & Units 12/2/22 00:00   WBC 10^3/mL 5.81 (E)   RBC 10^6/µL 4.57 (E)   Hemoglobin Quant 12.0 - 16.0 g/dL 12.7 (E)   Hematocrit 36 - 46 % 40.4 (E)   MCV fL 88 (E)   MCH pg 27.8 (E)   MCHC g/dL 31.4 (E)   RDW % 15.0 (E)   Platelet Count K/µL 574 (E)      12/2/22 00:00   Sed Rate 5 (E)         IMPRESSION/RECOMMENDATIONS:      1. Seropositive rheumatoid arthritis (postive RF), overall improvement with current treatment  -- continue Enbrel 50 mg SQ once weekly  -- continue methotrexate 17.5 mg PO weekly and folic acid 1 mg daily. Consider decreasing MTX dose next visit if continues to do well. 2. High risk med requiring monitoring  -- MTX: reviewed with pt her lab results.  Need to monitor CBC and CMP q 4-6 weeks      RTC in 2 months      Orders Placed This Encounter   Procedures    CBC with Auto Differential     Standing Status:   Future     Standing Expiration Date:   12/14/2023    Comprehensive Metabolic Panel     Standing Status:   Future     Standing Expiration Date:   12/14/2023    C-Reactive Protein     Standing Status:   Future     Standing Expiration Date:   12/14/2023    Sedimentation Rate     Standing Status:   Future     Standing Expiration Date:   12/14/2023              Smita Alberto MD    [address] [facility]  [address] [facility]. [address]  [phone]

## 2022-12-15 ASSESSMENT — JOINT PAIN
TOTAL NUMBER OF SWOLLEN JOINTS: 0
TOTAL NUMBER OF TENDER JOINTS: 0

## 2023-02-14 ENCOUNTER — TELEPHONE (OUTPATIENT)
Dept: RHEUMATOLOGY | Age: 68
End: 2023-02-14

## 2023-02-14 NOTE — TELEPHONE ENCOUNTER
Patient is returning call about her appt being r/s from 0930 to 0900. She said that they have to drop off her granddaughter that they are raising at school before they come and they wont let her drop her off too early and then depending on traffic and they are coming from over an hour away, she said she might be 5 to 10 minutes late if its at [facility]. She said she cant do an afternoon appt because she works and she cant do the 1040 appt time because that would be really pushing it for her to get back in time for work. Patient said she made this appt back in December and she doesn't understand what happened. She said she always picks this time because of her schedule. She needs tuesdays at 930.  Please contact patient to advise

## 2023-02-21 ENCOUNTER — OFFICE VISIT (OUTPATIENT)
Dept: RHEUMATOLOGY | Age: 68
End: 2023-02-21
Payer: COMMERCIAL

## 2023-02-21 VITALS
HEIGHT: 64 IN | HEART RATE: 83 BPM | WEIGHT: 139 LBS | SYSTOLIC BLOOD PRESSURE: 118 MMHG | BODY MASS INDEX: 23.73 KG/M2 | DIASTOLIC BLOOD PRESSURE: 78 MMHG | OXYGEN SATURATION: 97 %

## 2023-02-21 DIAGNOSIS — Z79.899 ENCOUNTER FOR LONG-TERM (CURRENT) USE OF HIGH-RISK MEDICATION: ICD-10-CM

## 2023-02-21 DIAGNOSIS — K11.7 XEROSTOMIA: ICD-10-CM

## 2023-02-21 DIAGNOSIS — M05.9 SEROPOSITIVE RHEUMATOID ARTHRITIS (HCC): Primary | ICD-10-CM

## 2023-02-21 PROCEDURE — 1123F ACP DISCUSS/DSCN MKR DOCD: CPT | Performed by: INTERNAL MEDICINE

## 2023-02-21 PROCEDURE — 99214 OFFICE O/P EST MOD 30 MIN: CPT | Performed by: INTERNAL MEDICINE

## 2023-02-21 RX ORDER — PILOCARPINE HYDROCHLORIDE 5 MG/1
5 TABLET, FILM COATED ORAL 2 TIMES DAILY
Qty: 60 TABLET | Refills: 3 | Status: SHIPPED | OUTPATIENT
Start: 2023-02-21 | End: 2023-03-23

## 2023-02-21 RX ORDER — FOLIC ACID 1 MG/1
2 TABLET ORAL DAILY
Qty: 60 TABLET | Refills: 5 | Status: SHIPPED | OUTPATIENT
Start: 2023-02-21 | End: 2023-03-23

## 2023-02-21 ASSESSMENT — ENCOUNTER SYMPTOMS
ABDOMINAL PAIN: 0
NAUSEA: 0
COUGH: 0
SHORTNESS OF BREATH: 0
VOMITING: 0

## 2023-02-21 ASSESSMENT — JOINT PAIN
TOTAL NUMBER OF SWOLLEN JOINTS: 0
TOTAL NUMBER OF TENDER JOINTS: 0

## 2023-02-21 NOTE — PROGRESS NOTES
[facility]) Physicians Rheumatology  Rheumatology Clinic Note      12/14/2022      CHIEF COMPLAINT:    Chief Complaint   Patient presents with    Follow-up     2 mnth f/u Seropositive rheumatoid arthritis ([facility] [address]    Pt stated no pain    Would like to talk about Stiff finger and dry mouth          HISTORY OF PRESENT ILLNESS:    79 y.o. female with seroporitive rheumatoid arthritis (positive RF) presents for follow up. She is on methotrexate 00.4 mg weekly, folic acid 1 mg daily and Enbrel 50 mg SQ once weekly (started Nov 2022)  Past med: hydroxychloroquine (migraines)    Overall is doing well. Denies any present joint pain, joint swelling or prolonged morning stiffness. No RA flare ups since seen last.    Reports having excessive dry mouth. No oral ulcers. Past Medical History:     has a past medical history of Rheumatoid arthritis ([facility] [address] Past Surgical History:     has a past surgical history that includes Tonsillectomy and Appendectomy. Current Medications:      Current Outpatient Medications:     pilocarpine (SALAGEN) 5 MG tablet, Take 1 tablet by mouth in the morning and at bedtime, Disp: 60 tablet, Rfl: 3    folic acid (FOLVITE) 1 MG tablet, Take 2 tablets by mouth daily, Disp: 60 tablet, Rfl: 5    methotrexate (RHEUMATREX) 2.5 MG chemo tablet, Take 7 tablets by mouth once a week for 28 days Take 2.5 mg by mouth once a week, Disp: 28 tablet, Rfl: 3    vitamin D 25 MCG (1000 UT) CAPS, Take by mouth daily, Disp: , Rfl:     Etanercept (ENBREL SURECLICK) 50 MG/ML SOAJ, Inject 50 mg into the skin once a week for 28 days, Disp: 4 each, Rfl: 5    ibuprofen (ADVIL;MOTRIN) 200 MG tablet, Take 200 mg by mouth every 6 hours as needed for Pain, Disp: , Rfl:     Allergies:    Codeine and Other    Social History:     reports that she has never smoked. She has never used smokeless tobacco.    Family History:   family history is not on file.       REVIEW OF SYSTEMS:    Review of Systems   Constitutional:  Negative for chills and fever. Respiratory:  Negative for cough and shortness of breath. Cardiovascular:  Negative for chest pain. Gastrointestinal:  Negative for abdominal pain, nausea and vomiting. Skin:  Negative for rash. PHYSICAL EXAM:    Vitals:    /78 (Site: Left Upper Arm, Position: Sitting, Cuff Size: Large Adult)   Pulse 83   Ht 5' 4\" (1.626 m)   Wt 139 lb (63 kg)   SpO2 97%   BMI 23.86 kg/m²     Physical Exam  Constitutional:       General: She is not in acute distress. Appearance: Normal appearance. Eyes:      Conjunctiva/sclera: Conjunctivae normal.   Pulmonary:      Effort: Pulmonary effort is normal.   Musculoskeletal:         General: Deformity present. No swelling or tenderness. Normal range of motion. Cervical back: Neck supple. Comments: Z-doformity in first thumb bilateral.   Skin:     General: Skin is warm and dry. Findings: No lesion or rash. Neurological:      General: No focal deficit present. Mental Status: She is alert and oriented to person, place, and time. Gait: Gait normal.   Psychiatric:         Mood and Affect: Mood normal.        Physical Exam    There is currently no information documented on the homunculus.  Go to the Rheumatology activity and complete the homunculus joint exam.    RODGERS-28 (ESR): --  RODGERS-28 (CRP): --         DATA:     Latest Reference Range & Units 2/10/23 00:00   Sodium mmol/L 142 (E)   Potassium mmol/L 4.10 (E)   Chloride mmol/L 104.4 (E)   CO2 mmol/L 27.1 (E)   BUN,BUNPL mg/dL 14.3 (E)   Creatinine  0.60 (E)   Anion Gap mmol/L 15 (E)   Glucose, Random mg/dL 85 (E)   CALCIUM, SERUM, 727611 mg/dL 9.3 (E)   Total Protein  6.1 (E)   Est, Glomerular Filtration Rate  60.0 (E)      2/10/23 00:00   CRP 3.0 (E)      Latest Reference Range & Units 2/10/23 00:00   Albumin  4.18 (E)   Alk Phos U/L 83 (E)   ALT U/L 14 (E)   AST U/L 21 (E)   BILIRUBIN TOTAL 0.1 - 1.4 mg/dL 0.53 (E)   Total Protein  6.1 (E)      Latest Reference Range & Units 2/10/23 00:00   WBC 10^3/mL 4.12 (E)   RBC 10^6/µL 4.26 (E)   Hemoglobin Quant 12.0 - 16.0 g/dL 12.1 (E)   Hematocrit 36 - 46 % 38.5 (E)   MCV fL 90 (E)   MCH pg 28.4 (E)   MCHC g/dL 31.4 (E)   RDW % 15.0 (E)   Platelet Count K/µL 013 (E)      2/10/23 00:00   Sed Rate 8 (E)       RAPID3 Composite Score = MDHAQ (0-10) + Patient pain VAS (0-10): + Patient global assessment VAS (0-10):     2/21/2023 --- RAPID 3: 0 + 0 + 0 = 0     Remission: <3  Low Disease Activity: <6  Moderate Disease Activity: >=6 and <=12  High Disease Activity: >12      IMPRESSION/RECOMMENDATIONS:      1. Seropositive rheumatoid arthritis (postive RF), overall improvement with current treatment  -- continue Enbrel 50 mg SQ once weekly  -- decrease methotrexate to 12.5 mg PO weekly and folic acid 1 mg daily since she has been doing very well    2. High risk med requiring monitoring  -- MTX: reviewed with pt her lab results.  Need to monitor CBC and CMP q 3 months      RTC in 3 months      Orders Placed This Encounter   Procedures    CBC with Auto Differential     Standing Status:   Future     Standing Expiration Date:   2/21/2024    Comprehensive Metabolic Panel     Standing Status:   Future     Standing Expiration Date:   2/21/2024    C-Reactive Protein     Standing Status:   Future     Standing Expiration Date:   2/21/2024    Sedimentation Rate     Standing Status:   Future     Standing Expiration Date:   2/21/2024              Jovana Ahmadi MD    [address]   [address] [facility]. [address] [facility]  [address] [facility] [place] [address]

## 2023-03-06 ENCOUNTER — TELEPHONE (OUTPATIENT)
Dept: RHEUMATOLOGY | Age: 68
End: 2023-03-06

## 2023-03-06 DIAGNOSIS — M05.9 SEROPOSITIVE RHEUMATOID ARTHRITIS (HCC): ICD-10-CM

## 2023-03-06 DIAGNOSIS — Z79.899 ENCOUNTER FOR LONG-TERM (CURRENT) USE OF HIGH-RISK MEDICATION: ICD-10-CM

## 2023-03-06 NOTE — TELEPHONE ENCOUNTER
Patients  stiven (on hippa) calling in regarding the methotrexate prescription,. The are asking if she can get 90 day supplies of that sent to [facility]? Please call them advise.

## 2023-03-14 ENCOUNTER — TELEPHONE (OUTPATIENT)
Dept: RHEUMATOLOGY | Age: 68
End: 2023-03-14

## 2023-03-14 DIAGNOSIS — M05.9 SEROPOSITIVE RHEUMATOID ARTHRITIS (HCC): ICD-10-CM

## 2023-03-14 DIAGNOSIS — Z79.899 ENCOUNTER FOR LONG-TERM (CURRENT) USE OF HIGH-RISK MEDICATION: ICD-10-CM

## 2023-05-19 LAB
ALBUMIN SERPL-MCNC: 4.14 G/DL
ALP BLD-CCNC: 72 U/L
ALT SERPL-CCNC: 13 U/L
ANION GAP SERPL CALCULATED.3IONS-SCNC: 11 MMOL/L
AST SERPL-CCNC: 22 U/L
BASOPHILS ABSOLUTE: 0.05 /ΜL
BASOPHILS RELATIVE PERCENT: 1.2 %
BILIRUB SERPL-MCNC: 0.48 MG/DL (ref 0.1–1.4)
BUN BLDV-MCNC: 8.7 MG/DL
C-REACTIVE PROTEIN: 3
CALCIUM SERPL-MCNC: 9.2 MG/DL
CHLORIDE BLD-SCNC: 105.6 MMOL/L
CO2: 27.8 MMOL/L
CREAT SERPL-MCNC: 0.6 MG/DL
EGFR: 60
EOSINOPHILS ABSOLUTE: 0.13 /ΜL
EOSINOPHILS RELATIVE PERCENT: 3.2 %
GLUCOSE BLD-MCNC: 80 MG/DL
HCT VFR BLD CALC: 37.9 % (ref 36–46)
HEMOGLOBIN: 11.9 G/DL (ref 12–16)
LYMPHOCYTES ABSOLUTE: 1.12 /ΜL
LYMPHOCYTES RELATIVE PERCENT: 27.6 %
MCH RBC QN AUTO: 29 PG
MCHC RBC AUTO-ENTMCNC: 31.4 G/DL
MCV RBC AUTO: 92 FL
MONOCYTES ABSOLUTE: 0.48 /ΜL
MONOCYTES RELATIVE PERCENT: 11.8 %
NEUTROPHILS ABSOLUTE: 2.28 /ΜL
NEUTROPHILS RELATIVE PERCENT: 56.2 %
PDW BLD-RTO: 14.5 %
PLATELET # BLD: 213 K/ΜL
PMV BLD AUTO: ABNORMAL FL
POTASSIUM SERPL-SCNC: 4.61 MMOL/L
RBC # BLD: 4.1 10^6/ΜL
SEDIMENTATION RATE, ERYTHROCYTE: 5
SODIUM BLD-SCNC: 140 MMOL/L
TOTAL PROTEIN: 5.9
WBC # BLD: 4.06 10^3/ML

## 2023-05-23 ENCOUNTER — OFFICE VISIT (OUTPATIENT)
Dept: RHEUMATOLOGY | Age: 68
End: 2023-05-23
Payer: COMMERCIAL

## 2023-05-23 VITALS
BODY MASS INDEX: 24.34 KG/M2 | HEIGHT: 64 IN | OXYGEN SATURATION: 98 % | DIASTOLIC BLOOD PRESSURE: 74 MMHG | WEIGHT: 142.6 LBS | SYSTOLIC BLOOD PRESSURE: 118 MMHG | HEART RATE: 78 BPM

## 2023-05-23 DIAGNOSIS — Z79.899 ENCOUNTER FOR LONG-TERM (CURRENT) USE OF HIGH-RISK MEDICATION: ICD-10-CM

## 2023-05-23 DIAGNOSIS — M05.9 SEROPOSITIVE RHEUMATOID ARTHRITIS (HCC): ICD-10-CM

## 2023-05-23 PROCEDURE — 99214 OFFICE O/P EST MOD 30 MIN: CPT | Performed by: INTERNAL MEDICINE

## 2023-05-23 PROCEDURE — 1123F ACP DISCUSS/DSCN MKR DOCD: CPT | Performed by: INTERNAL MEDICINE

## 2023-05-23 RX ORDER — MULTIVITAMIN WITH IRON
TABLET ORAL
COMMUNITY

## 2023-05-23 RX ORDER — METHYLPREDNISOLONE 4 MG/1
TABLET ORAL
Qty: 1 KIT | Refills: 0 | Status: SHIPPED | OUTPATIENT
Start: 2023-05-23 | End: 2023-05-23

## 2023-05-23 RX ORDER — METHYLPREDNISOLONE 4 MG/1
TABLET ORAL
Qty: 1 KIT | Refills: 0 | Status: SHIPPED | OUTPATIENT
Start: 2023-05-23 | End: 2023-05-29

## 2023-05-23 RX ORDER — FOLIC ACID 1 MG/1
2 TABLET ORAL DAILY
Qty: 180 TABLET | Refills: 1 | Status: SHIPPED | OUTPATIENT
Start: 2023-05-23 | End: 2023-08-21

## 2023-05-23 RX ORDER — MEDROXYPROGESTERONE ACETATE 150 MG/ML
50 INJECTION, SUSPENSION INTRAMUSCULAR WEEKLY
Qty: 4 ML | Refills: 5 | Status: ACTIVE | OUTPATIENT
Start: 2023-05-23 | End: 2023-06-20

## 2023-05-23 ASSESSMENT — ENCOUNTER SYMPTOMS
COUGH: 0
ABDOMINAL PAIN: 0
VOMITING: 0
SHORTNESS OF BREATH: 0
NAUSEA: 0

## 2023-05-23 ASSESSMENT — JOINT PAIN
TOTAL NUMBER OF TENDER JOINTS: 0
TOTAL NUMBER OF SWOLLEN JOINTS: 0

## 2023-05-23 NOTE — PROGRESS NOTES
(Patient not taking: Reported on 5/23/2023), Disp: , Rfl:     Allergies:    Codeine, Other, and Sulfamethoxazole    Social History:     reports that she has never smoked. She has never used smokeless tobacco. She reports that she does not drink alcohol and does not use drugs. Family History:   family history includes Breast Cancer in her mother; Rheum Arthritis in her mother. REVIEW OF SYSTEMS:    Review of Systems   Constitutional:  Negative for chills and fever. Respiratory:  Negative for cough and shortness of breath. Cardiovascular:  Negative for chest pain. Gastrointestinal:  Negative for abdominal pain, nausea and vomiting. Skin:  Negative for rash. PHYSICAL EXAM:    Vitals:    /74 (Site: Left Upper Arm, Position: Sitting, Cuff Size: Medium Adult)   Pulse 78   Ht 5' 4\" (1.626 m)   Wt 142 lb 9.6 oz (64.7 kg)   SpO2 98%   BMI 24.48 kg/m²     Physical Exam  Constitutional:       General: She is not in acute distress. Appearance: Normal appearance. Eyes:      Conjunctiva/sclera: Conjunctivae normal.   Pulmonary:      Effort: Pulmonary effort is normal.   Musculoskeletal:         General: Deformity (Z-doformity in first thumb bilateral.) present. No swelling or tenderness. Normal range of motion. Cervical back: Neck supple. Skin:     General: Skin is warm and dry. Findings: No lesion or rash. Neurological:      General: No focal deficit present. Mental Status: She is alert and oriented to person, place, and time.       Gait: Gait normal.   Psychiatric:         Mood and Affect: Mood normal.        Physical Exam        RODGERS-28 (ESR): 1.13 (Remission)  RODGERS-28 (CRP): 1.46 (Remission)         DATA:     Latest Reference Range & Units 2/10/23 00:00   Sodium mmol/L 142 (E)   Potassium mmol/L 4.10 (E)   Chloride mmol/L 104.4 (E)   CO2 mmol/L 27.1 (E)   BUN,BUNPL mg/dL 14.3 (E)   Creatinine  0.60 (E)   Anion Gap mmol/L 15 (E)   Glucose, Random mg/dL 85 (E)   CALCIUM,

## 2023-06-06 ENCOUNTER — TELEPHONE (OUTPATIENT)
Dept: RHEUMATOLOGY | Age: 68
End: 2023-06-06

## 2023-06-06 DIAGNOSIS — M05.9 SEROPOSITIVE RHEUMATOID ARTHRITIS (HCC): ICD-10-CM

## 2023-06-06 DIAGNOSIS — Z79.899 ENCOUNTER FOR LONG-TERM (CURRENT) USE OF HIGH-RISK MEDICATION: ICD-10-CM

## 2023-06-06 RX ORDER — FOLIC ACID 1 MG/1
2 TABLET ORAL DAILY
Qty: 180 TABLET | Refills: 1 | Status: SHIPPED | OUTPATIENT
Start: 2023-06-06

## 2023-06-06 NOTE — TELEPHONE ENCOUNTER
Patient says her mail in pharmacy will not fill her folic acid 1 MG It will need to be sent to [facility]

## 2023-08-15 LAB
ALBUMIN SERPL-MCNC: 4.2 G/DL
ALP BLD-CCNC: 81 U/L
ALT SERPL-CCNC: 14 U/L
ANION GAP SERPL CALCULATED.3IONS-SCNC: 10 MMOL/L
AST SERPL-CCNC: 21 U/L
BASOPHILS ABSOLUTE: 0.03 /ΜL
BASOPHILS RELATIVE PERCENT: 0.5 %
BILIRUB SERPL-MCNC: 0.65 MG/DL (ref 0.1–1.4)
BUN BLDV-MCNC: 9.5 MG/DL
C-REACTIVE PROTEIN: 3
CALCIUM SERPL-MCNC: 9.4 MG/DL
CHLORIDE BLD-SCNC: 102.1 MMOL/L
CO2: 28.6 MMOL/L
CREAT SERPL-MCNC: 0.63 MG/DL
EGFR: 60
EOSINOPHILS ABSOLUTE: 0.15 /ΜL
EOSINOPHILS RELATIVE PERCENT: 2.7 %
GLUCOSE BLD-MCNC: 86 MG/DL
HCT VFR BLD CALC: 40.9 % (ref 36–46)
HEMOGLOBIN: 13 G/DL (ref 12–16)
LYMPHOCYTES ABSOLUTE: 1.18 /ΜL
LYMPHOCYTES RELATIVE PERCENT: 20.9 %
MCH RBC QN AUTO: 29.3 PG
MCHC RBC AUTO-ENTMCNC: 31.8 G/DL
MCV RBC AUTO: 92 FL
MONOCYTES ABSOLUTE: 0.59 /ΜL
MONOCYTES RELATIVE PERCENT: 10.4 %
NEUTROPHILS ABSOLUTE: 3.69 /ΜL
NEUTROPHILS RELATIVE PERCENT: 65.3 %
PDW BLD-RTO: 14 %
PLATELET # BLD: 276 K/ΜL
PMV BLD AUTO: NORMAL FL
POTASSIUM SERPL-SCNC: 4.13 MMOL/L
RBC # BLD: 4.43 10^6/ΜL
SEDIMENTATION RATE, ERYTHROCYTE: 1
SODIUM BLD-SCNC: 137 MMOL/L
TOTAL PROTEIN: 6.4
WBC # BLD: 5.65 10^3/ML

## 2023-08-22 ENCOUNTER — OFFICE VISIT (OUTPATIENT)
Dept: RHEUMATOLOGY | Age: 68
End: 2023-08-22
Payer: COMMERCIAL

## 2023-08-22 VITALS
BODY MASS INDEX: 24.89 KG/M2 | HEIGHT: 64 IN | WEIGHT: 145.8 LBS | HEART RATE: 70 BPM | DIASTOLIC BLOOD PRESSURE: 78 MMHG | SYSTOLIC BLOOD PRESSURE: 120 MMHG | OXYGEN SATURATION: 98 %

## 2023-08-22 DIAGNOSIS — Z79.899 ENCOUNTER FOR LONG-TERM (CURRENT) USE OF HIGH-RISK MEDICATION: ICD-10-CM

## 2023-08-22 DIAGNOSIS — M05.9 SEROPOSITIVE RHEUMATOID ARTHRITIS (HCC): Primary | ICD-10-CM

## 2023-08-22 PROCEDURE — 1123F ACP DISCUSS/DSCN MKR DOCD: CPT | Performed by: INTERNAL MEDICINE

## 2023-08-22 PROCEDURE — 99214 OFFICE O/P EST MOD 30 MIN: CPT | Performed by: INTERNAL MEDICINE

## 2023-08-22 RX ORDER — FOLIC ACID 1 MG/1
2 TABLET ORAL DAILY
Qty: 180 TABLET | Refills: 1 | Status: SHIPPED | OUTPATIENT
Start: 2023-08-22

## 2023-08-22 ASSESSMENT — JOINT PAIN
TOTAL NUMBER OF SWOLLEN JOINTS: 1
TOTAL NUMBER OF TENDER JOINTS: 0

## 2023-08-22 ASSESSMENT — ENCOUNTER SYMPTOMS
SHORTNESS OF BREATH: 0
NAUSEA: 0
COUGH: 0
VOMITING: 0
ABDOMINAL PAIN: 0

## 2023-08-22 NOTE — PROGRESS NOTES
[facility]) Physicians Rheumatology  Rheumatology Clinic Note      12/14/2022      CHIEF COMPLAINT:    Chief Complaint   Patient presents with    3 Month Follow-Up     Seropositive rheumatoid arthritis ([address])    Other     Pt is tolerating well. No pain         HISTORY OF PRESENT ILLNESS:    79 y.o. female with seroporitive rheumatoid arthritis (positive RF) presents for follow up. She is on methotrexate 12.5 mg weekly (decreased last visit), folic acid 1 mg daily and Enbrel 50 mg SQ once weekly (started Nov 2022)  Past med: hydroxychloroquine (migraines)    Overall is doing well. Denies any present joint pain, joint swelling or prolonged morning stiffness. No RA flare ups since seen last.      Past Medical History:     has a past medical history of Rheumatoid arthritis ([address] Past Surgical History:     has a past surgical history that includes Tonsillectomy and Appendectomy. Current Medications:      Current Outpatient Medications:     folic acid (FOLVITE) 1 MG tablet, Take 2 tablets by mouth daily, Disp: 180 tablet, Rfl: 1    methotrexate (RHEUMATREX) 2.5 MG chemo tablet, Take 5 tablets by mouth once a week, Disp: 60 tablet, Rfl: 0    magnesium (MAGNESIUM-OXIDE) 250 MG TABS tablet, 1 capsule with food, Disp: , Rfl:     vitamin D 25 MCG (1000 UT) CAPS, Take by mouth daily, Disp: , Rfl:     Etanercept (ENBREL SURECLICK) 50 MG/ML SOAJ, Inject 50 mg into the skin once a week for 28 days, Disp: 4 mL, Rfl: 5    pilocarpine (SALAGEN) 5 MG tablet, Take 1 tablet by mouth in the morning and at bedtime, Disp: 60 tablet, Rfl: 3    ibuprofen (ADVIL;MOTRIN) 200 MG tablet, Take 200 mg by mouth every 6 hours as needed for Pain (Patient not taking: Reported on 5/23/2023), Disp: , Rfl:     Allergies:    Codeine, Other, and Sulfamethoxazole    Social History:     reports that she has never smoked. She has never used smokeless tobacco. She reports that she does not drink alcohol and does not use drugs.     Family History:

## 2023-11-21 ENCOUNTER — OFFICE VISIT (OUTPATIENT)
Dept: RHEUMATOLOGY | Age: 68
End: 2023-11-21
Payer: COMMERCIAL

## 2023-11-21 VITALS
HEIGHT: 64 IN | WEIGHT: 146.2 LBS | HEART RATE: 81 BPM | BODY MASS INDEX: 24.96 KG/M2 | DIASTOLIC BLOOD PRESSURE: 68 MMHG | SYSTOLIC BLOOD PRESSURE: 102 MMHG | OXYGEN SATURATION: 97 %

## 2023-11-21 DIAGNOSIS — Z79.899 ENCOUNTER FOR LONG-TERM (CURRENT) USE OF HIGH-RISK MEDICATION: ICD-10-CM

## 2023-11-21 DIAGNOSIS — M05.9 SEROPOSITIVE RHEUMATOID ARTHRITIS (HCC): Primary | ICD-10-CM

## 2023-11-21 PROCEDURE — 99214 OFFICE O/P EST MOD 30 MIN: CPT | Performed by: INTERNAL MEDICINE

## 2023-11-21 PROCEDURE — 1123F ACP DISCUSS/DSCN MKR DOCD: CPT | Performed by: INTERNAL MEDICINE

## 2023-11-21 RX ORDER — FOLIC ACID 1 MG/1
2 TABLET ORAL DAILY
Qty: 90 TABLET | Refills: 3 | Status: SHIPPED | OUTPATIENT
Start: 2023-11-21

## 2023-11-21 ASSESSMENT — ENCOUNTER SYMPTOMS
VOMITING: 0
COUGH: 0
SHORTNESS OF BREATH: 0
ABDOMINAL PAIN: 0
NAUSEA: 0

## 2023-11-21 ASSESSMENT — JOINT PAIN
TOTAL NUMBER OF SWOLLEN JOINTS: 1
TOTAL NUMBER OF TENDER JOINTS: 0

## 2023-11-21 NOTE — PROGRESS NOTES
[address] Physicians Rheumatology  Rheumatology Clinic Note      12/14/2022      CHIEF COMPLAINT:    Chief Complaint   Patient presents with    3 Month Follow-Up     Seropositive rheumatoid arthritis ([address] [facility])    pt is tolerating well. Mtx needs refilled         HISTORY OF PRESENT ILLNESS:    76 y.o. female with seroporitive rheumatoid arthritis (+ RF) presents for follow up. She is on methotrexate 7.5 mg weekly (decreased last visit), folic acid 1 mg daily and Enbrel 50 mg SQ once weekly (started Nov 2022)  Past med: hydroxychloroquine (migraines)    Overall is doing well. Has not noticed any difference after decreasing methotrexate dose. Denies any present joint pain, joint swelling or prolonged morning stiffness. No RA flare ups since seen last.      Past Medical History:     has a past medical history of Rheumatoid arthritis ([address] [facility]). Past Surgical History:     has a past surgical history that includes Tonsillectomy and Appendectomy. Current Medications:      Current Outpatient Medications:     folic acid (FOLVITE) 1 MG tablet, Take 2 tablets by mouth daily, Disp: 90 tablet, Rfl: 3    Etanercept (ENBREL SURECLICK) 50 MG/ML SOAJ, Inject 50 mg into the skin once a week for 28 days, Disp: 4 mL, Rfl: 5    magnesium (MAGNESIUM-OXIDE) 250 MG TABS tablet, 1 capsule with food, Disp: , Rfl:     vitamin D 25 MCG (1000 UT) CAPS, Take by mouth daily, Disp: , Rfl:     pilocarpine (SALAGEN) 5 MG tablet, Take 1 tablet by mouth in the morning and at bedtime, Disp: 60 tablet, Rfl: 3    ibuprofen (ADVIL;MOTRIN) 200 MG tablet, Take 200 mg by mouth every 6 hours as needed for Pain (Patient not taking: Reported on 5/23/2023), Disp: , Rfl:     Allergies:    Codeine, Other, and Sulfamethoxazole    Social History:     reports that she has never smoked. She has never used smokeless tobacco. She reports that she does not drink alcohol and does not use drugs.     Family History:   family history includes Breast Cancer in her

## 2023-11-28 ENCOUNTER — TELEPHONE (OUTPATIENT)
Dept: RHEUMATOLOGY | Age: 68
End: 2023-11-28

## 2023-11-28 NOTE — TELEPHONE ENCOUNTER
Attempted to contact the pt, no answer. Message has been left for the pt to call the office. [employer] is needing proof of income for each member in the household. A copy of her most recent 200, SSI or pension statement will work. This will be needing in order to complete the application.

## 2024-02-20 ENCOUNTER — OFFICE VISIT (OUTPATIENT)
Dept: RHEUMATOLOGY | Age: 69
End: 2024-02-20
Payer: COMMERCIAL

## 2024-02-20 VITALS
SYSTOLIC BLOOD PRESSURE: 118 MMHG | OXYGEN SATURATION: 98 % | HEIGHT: 64 IN | WEIGHT: 131.8 LBS | DIASTOLIC BLOOD PRESSURE: 70 MMHG | HEART RATE: 82 BPM | BODY MASS INDEX: 22.5 KG/M2

## 2024-02-20 DIAGNOSIS — Z79.899 ENCOUNTER FOR LONG-TERM (CURRENT) USE OF HIGH-RISK MEDICATION: ICD-10-CM

## 2024-02-20 DIAGNOSIS — R68.2 DRY MOUTH: Primary | ICD-10-CM

## 2024-02-20 DIAGNOSIS — M05.9 SEROPOSITIVE RHEUMATOID ARTHRITIS (HCC): ICD-10-CM

## 2024-02-20 PROCEDURE — 1123F ACP DISCUSS/DSCN MKR DOCD: CPT | Performed by: INTERNAL MEDICINE

## 2024-02-20 PROCEDURE — 99214 OFFICE O/P EST MOD 30 MIN: CPT | Performed by: INTERNAL MEDICINE

## 2024-02-20 RX ORDER — ASCORBIC ACID 500 MG
500 TABLET ORAL 2 TIMES DAILY
COMMUNITY

## 2024-02-20 RX ORDER — CEVIMELINE HYDROCHLORIDE 30 MG/1
30 CAPSULE ORAL 3 TIMES DAILY
Qty: 90 CAPSULE | Refills: 5 | Status: SHIPPED | OUTPATIENT
Start: 2024-02-20 | End: 2024-08-18

## 2024-02-20 RX ORDER — ZINC GLUCONATE 50 MG
50 TABLET ORAL DAILY
COMMUNITY

## 2024-02-20 ASSESSMENT — ENCOUNTER SYMPTOMS
COUGH: 0
ABDOMINAL PAIN: 0
NAUSEA: 0
VOMITING: 0
SHORTNESS OF BREATH: 0

## 2024-02-20 NOTE — PROGRESS NOTES
[facility] Physicians Rheumatology  Rheumatology Clinic Note      12/14/2022      CHIEF COMPLAINT:    Chief Complaint   Patient presents with    3 Month Follow-Up     Seropositive rheumatoid arthritis (HCC)    Other     Pt is having excessive dry mouth since the beginning of December. No pain         HISTORY OF PRESENT ILLNESS:    68 y.o. female with seroporitive rheumatoid arthritis (+ RF) presents for follow up. She is on Enbrel 50 mg SQ once weekly (started Nov 2022). Methotrexate was d/c last visit as she was doing really well since starting Enbrel.  Past med: hydroxychloroquine (migraines), methotrexate    She stopped methotrexate last visit. No RA flare ups since seen last.  Main concern is worsening dry mouth.  No oral ulcers.  Has dry eyes. Uses restasis.  No skin rash.        Past Medical History:     has a past medical history of Rheumatoid arthritis (HCC).    Past Surgical History:     has a past surgical history that includes Tonsillectomy and Appendectomy.    Current Medications:      Current Outpatient Medications:     vitamin C (ASCORBIC ACID) 500 MG tablet, Take 1 tablet by mouth 2 times daily, Disp: , Rfl:     zinc gluconate 50 MG tablet, Take 1 tablet by mouth daily, Disp: , Rfl:     cevimeline (EVOXAC) 30 MG capsule, Take 1 capsule by mouth 3 times daily, Disp: 90 capsule, Rfl: 5    folic acid (FOLVITE) 1 MG tablet, Take 2 tablets by mouth daily, Disp: 90 tablet, Rfl: 3    magnesium (MAGNESIUM-OXIDE) 250 MG TABS tablet, 1 capsule with food, Disp: , Rfl:     vitamin D 25 MCG (1000 UT) CAPS, Take by mouth daily, Disp: , Rfl:     Etanercept (ENBREL SURECLICK) 50 MG/ML SOAJ, Inject 50 mg into the skin once a week for 28 days, Disp: 4 mL, Rfl: 5    ibuprofen (ADVIL;MOTRIN) 200 MG tablet, Take 200 mg by mouth every 6 hours as needed for Pain (Patient not taking: Reported on 5/23/2023), Disp: , Rfl:     Allergies:    Codeine, Other, and Sulfamethoxazole    Social History:     reports that she has never

## 2024-06-24 ENCOUNTER — TELEPHONE (OUTPATIENT)
Dept: RHEUMATOLOGY | Age: 69
End: 2024-06-24

## 2024-06-24 NOTE — TELEPHONE ENCOUNTER
Please instruct pt to take the prednsione course that she has at home. If this does not help, then we need to see her in clinic for evaluation.

## 2024-06-24 NOTE — TELEPHONE ENCOUNTER
Where is pain located?  finger joints, left wrist, right ankle    When did the pain start?  3 weeks ago    Rate the pain 1-10. Ten being the worst  2-3    Describe the pain.  (Dull, Aching, Stabbing, radiating, etc)  finger joints=achy; hurts to move ankle and wrist certain ways    Has this pain occurred in the past?  Fingers= yes, wrist and ankle not so much    What have you used to alleviate this pain?  Advil, still taking the Enbrel    What aggravates it?  Not noticed anything that makes it worse    Joint swelling or redness?  Swelling in fingers. No redness    She does have a prednisone pack that she did not use in the past. She can take if needed.

## 2024-08-20 ENCOUNTER — OFFICE VISIT (OUTPATIENT)
Dept: RHEUMATOLOGY | Age: 69
End: 2024-08-20
Payer: COMMERCIAL

## 2024-08-20 VITALS
SYSTOLIC BLOOD PRESSURE: 102 MMHG | WEIGHT: 130.2 LBS | HEART RATE: 73 BPM | BODY MASS INDEX: 22.23 KG/M2 | DIASTOLIC BLOOD PRESSURE: 66 MMHG | HEIGHT: 64 IN | OXYGEN SATURATION: 97 %

## 2024-08-20 DIAGNOSIS — Z79.899 ENCOUNTER FOR LONG-TERM (CURRENT) USE OF HIGH-RISK MEDICATION: ICD-10-CM

## 2024-08-20 DIAGNOSIS — M05.9 SEROPOSITIVE RHEUMATOID ARTHRITIS (HCC): ICD-10-CM

## 2024-08-20 PROCEDURE — 1123F ACP DISCUSS/DSCN MKR DOCD: CPT | Performed by: INTERNAL MEDICINE

## 2024-08-20 PROCEDURE — 99214 OFFICE O/P EST MOD 30 MIN: CPT | Performed by: INTERNAL MEDICINE

## 2024-08-20 RX ORDER — MEDROXYPROGESTERONE ACETATE 150 MG/ML
50 INJECTION, SUSPENSION INTRAMUSCULAR WEEKLY
Qty: 4 ML | Refills: 5 | Status: ACTIVE | OUTPATIENT
Start: 2024-08-20 | End: 2024-09-17

## 2024-08-20 RX ORDER — FOLIC ACID 1 MG/1
2 TABLET ORAL DAILY
Qty: 180 TABLET | Refills: 3 | Status: SHIPPED | OUTPATIENT
Start: 2024-08-20

## 2024-08-20 ASSESSMENT — ENCOUNTER SYMPTOMS
SHORTNESS OF BREATH: 0
ABDOMINAL PAIN: 0
COUGH: 0
VOMITING: 0
NAUSEA: 0

## 2024-10-04 DIAGNOSIS — Z79.899 ENCOUNTER FOR LONG-TERM (CURRENT) USE OF HIGH-RISK MEDICATION: ICD-10-CM

## 2024-10-04 DIAGNOSIS — M05.9 SEROPOSITIVE RHEUMATOID ARTHRITIS (HCC): ICD-10-CM

## 2024-10-07 RX ORDER — FOLIC ACID 1 MG/1
1 TABLET ORAL DAILY
Qty: 90 TABLET | Refills: 1 | Status: SHIPPED | OUTPATIENT
Start: 2024-10-07

## 2024-11-11 ENCOUNTER — TELEPHONE (OUTPATIENT)
Dept: RHEUMATOLOGY | Age: 69
End: 2024-11-11

## 2024-11-11 NOTE — TELEPHONE ENCOUNTER
Patient calling about [employer] renewal. Patient stated she has only received an [employer] renewal reminder. Patient calling for assistance. Please advise. Thank you.

## 2024-11-14 NOTE — TELEPHONE ENCOUNTER
Spoke with the patient. I will start the process for her renewal for Enbrel. She has given me permission to sign the application as well. I will reach out to the patient to assist with LIS.

## 2024-11-15 NOTE — TELEPHONE ENCOUNTER
The patient phoned into the office regarding the changes with [employer] income guidelines. Patient stated she is well over income to be eligible for free drug. Income guideline for a family of two has changed to $61,320. She is asking if you would be able to run the medication through her insurance again.

## 2024-11-26 ENCOUNTER — TELEPHONE (OUTPATIENT)
Dept: RHEUMATOLOGY | Age: 69
End: 2024-11-26

## 2024-11-26 NOTE — TELEPHONE ENCOUNTER
The patient phoned into the office regarding the move letter she received in the mail. She stated that there was a message at the bottom of the letter stating insurance coverage might change for the provider and she should contact the number that was provided on the letter. She did call and it was asking her to switch to there insurance. The patient would like to know how would she find out if Dr Huynh is in network for her insurance. Please advise

## 2024-11-26 NOTE — TELEPHONE ENCOUNTER
Insurance is not changing for the office. There just may be some changes on increased charges the patients may occur. She is medicaid and will not be effected at all.    Please call her and let her know she is fine to keep as is

## 2025-01-03 DIAGNOSIS — M05.9 SEROPOSITIVE RHEUMATOID ARTHRITIS (HCC): ICD-10-CM

## 2025-01-03 DIAGNOSIS — Z79.899 ENCOUNTER FOR LONG-TERM (CURRENT) USE OF HIGH-RISK MEDICATION: ICD-10-CM

## 2025-01-03 NOTE — TELEPHONE ENCOUNTER
Jenna is requesting a refill of their   Requested Prescriptions     Pending Prescriptions Disp Refills    Etanercept (ENBREL SURECLICK) 50 MG/ML SOAJ 4 mL 5     Sig: Inject 50 mg into the skin once a week for 28 days   . Please advise.      Last Appt:  Visit date not found  Next Appt:   Visit date not found  Preferred pharmacy:   [facility] [id number] [place] [address] [address] [place] [phone] - F [phone]

## 2025-01-06 RX ORDER — MEDROXYPROGESTERONE ACETATE 150 MG/ML
50 INJECTION, SUSPENSION INTRAMUSCULAR WEEKLY
Qty: 4 ML | Refills: 5 | Status: SHIPPED | OUTPATIENT
Start: 2025-01-06 | End: 2025-02-03

## 2025-02-18 ENCOUNTER — OFFICE VISIT (OUTPATIENT)
Age: 70
End: 2025-02-18
Payer: MEDICARE

## 2025-02-18 VITALS
BODY MASS INDEX: 22.9 KG/M2 | DIASTOLIC BLOOD PRESSURE: 78 MMHG | OXYGEN SATURATION: 99 % | HEART RATE: 76 BPM | SYSTOLIC BLOOD PRESSURE: 122 MMHG | WEIGHT: 134.1 LBS | HEIGHT: 64 IN

## 2025-02-18 DIAGNOSIS — E55.9 VITAMIN D DEFICIENCY: Primary | ICD-10-CM

## 2025-02-18 DIAGNOSIS — M05.9 SEROPOSITIVE RHEUMATOID ARTHRITIS (HCC): ICD-10-CM

## 2025-02-18 DIAGNOSIS — Z79.899 ENCOUNTER FOR LONG-TERM (CURRENT) USE OF HIGH-RISK MEDICATION: ICD-10-CM

## 2025-02-18 PROCEDURE — 1123F ACP DISCUSS/DSCN MKR DOCD: CPT | Performed by: INTERNAL MEDICINE

## 2025-02-18 PROCEDURE — 1159F MED LIST DOCD IN RCRD: CPT | Performed by: INTERNAL MEDICINE

## 2025-02-18 PROCEDURE — 1036F TOBACCO NON-USER: CPT | Performed by: INTERNAL MEDICINE

## 2025-02-18 PROCEDURE — G8420 CALC BMI NORM PARAMETERS: HCPCS | Performed by: INTERNAL MEDICINE

## 2025-02-18 PROCEDURE — 99214 OFFICE O/P EST MOD 30 MIN: CPT | Performed by: INTERNAL MEDICINE

## 2025-02-18 PROCEDURE — 1090F PRES/ABSN URINE INCON ASSESS: CPT | Performed by: INTERNAL MEDICINE

## 2025-02-18 PROCEDURE — G8399 PT W/DXA RESULTS DOCUMENT: HCPCS | Performed by: INTERNAL MEDICINE

## 2025-02-18 PROCEDURE — 99213 OFFICE O/P EST LOW 20 MIN: CPT | Performed by: INTERNAL MEDICINE

## 2025-02-18 PROCEDURE — 3017F COLORECTAL CA SCREEN DOC REV: CPT | Performed by: INTERNAL MEDICINE

## 2025-02-18 PROCEDURE — G2211 COMPLEX E/M VISIT ADD ON: HCPCS | Performed by: INTERNAL MEDICINE

## 2025-02-18 PROCEDURE — G8427 DOCREV CUR MEDS BY ELIG CLIN: HCPCS | Performed by: INTERNAL MEDICINE

## 2025-02-18 RX ORDER — MEDROXYPROGESTERONE ACETATE 150 MG/ML
50 INJECTION, SUSPENSION INTRAMUSCULAR WEEKLY
Qty: 4 ML | Refills: 5 | Status: ACTIVE | OUTPATIENT
Start: 2025-02-18 | End: 2025-03-18

## 2025-02-18 RX ORDER — LOTEPREDNOL ETABONATE 5 MG/ML
SUSPENSION/ DROPS OPHTHALMIC
COMMUNITY
Start: 2025-02-01

## 2025-02-18 RX ORDER — FOLIC ACID 1 MG/1
1 TABLET ORAL DAILY
Qty: 90 TABLET | Refills: 1 | Status: SHIPPED | OUTPATIENT
Start: 2025-02-18

## 2025-02-18 ASSESSMENT — JOINT PAIN
TOTAL NUMBER OF TENDER JOINTS: 0
TOTAL NUMBER OF SWOLLEN JOINTS: 0

## 2025-02-18 ASSESSMENT — ENCOUNTER SYMPTOMS
ABDOMINAL PAIN: 0
SHORTNESS OF BREATH: 0
NAUSEA: 0
VOMITING: 0
COUGH: 0

## 2025-02-18 NOTE — PROGRESS NOTES
[facility] Physicians Rheumatology  Rheumatology Clinic Note      12/14/2022      CHIEF COMPLAINT:    Chief Complaint   Patient presents with    Follow-up     6 month follow up Seropositive rheumatoid arthritis (HCC)    Other     Patient is tolerating well.  Pain level 0         HISTORY OF PRESENT ILLNESS:    69 y.o. female with seroporitive rheumatoid arthritis (+RF) presents for follow up. She is on Enbrel 50 mg SQ once weekly (started Nov 2022). Evoxac was prescribed lat visit for dry eyes and dry mouth.  Past med: hydroxychloroquine (migraines), methotrexate (d/c as she was doing well)    No significant RA flare ups since seen last.   Her main concern is persistent severe dry mouth. Would like to stop Evoxac as it does not seem to be helpful.    No oral ulcers.  Has dry eyes. Uses restasis.  No skin rash.      Past Medical History:     has a past medical history of Rheumatoid arthritis (HCC).    Past Surgical History:     has a past surgical history that includes Tonsillectomy and Appendectomy.    Current Medications:      Current Outpatient Medications:     folic acid (FOLVITE) 1 MG tablet, Take 1 tablet by mouth daily, Disp: 90 tablet, Rfl: 1    loteprednol (LOTEMAX) 0.5 % ophthalmic suspension, , Disp: , Rfl:     Etanercept (ENBREL SURECLICK) 50 MG/ML SOAJ, Inject 50 mg into the skin once a week for 28 days, Disp: 4 mL, Rfl: 5    vitamin C (ASCORBIC ACID) 500 MG tablet, Take 1 tablet by mouth 2 times daily, Disp: , Rfl:     magnesium (MAGNESIUM-OXIDE) 250 MG TABS tablet, 1 capsule with food, Disp: , Rfl:     vitamin D 25 MCG (1000 UT) CAPS, Take by mouth daily, Disp: , Rfl:     ibuprofen (ADVIL;MOTRIN) 200 MG tablet, Take 1 tablet by mouth every 6 hours as needed for Pain, Disp: , Rfl:     zinc gluconate 50 MG tablet, Take 1 tablet by mouth daily (Patient not taking: Reported on 2/18/2025), Disp: , Rfl:     Allergies:    Codeine, Other, and Sulfamethoxazole    Social History:     reports that she has never

## 2025-04-17 LAB
ALBUMIN: 4.2 G/DL
ALP BLD-CCNC: 76 U/L
ALT SERPL-CCNC: 15 U/L
ANION GAP SERPL CALCULATED.3IONS-SCNC: ABNORMAL MMOL/L
AST SERPL-CCNC: 24 U/L
BASOPHILS ABSOLUTE: 0.1 /ΜL
BASOPHILS RELATIVE PERCENT: 1 %
BILIRUB SERPL-MCNC: 0.4 MG/DL (ref 0.1–1.4)
BUN BLDV-MCNC: 11 MG/DL
CALCIUM SERPL-MCNC: 8.9 MG/DL
CHLORIDE BLD-SCNC: 96 MMOL/L
CO2: 22 MMOL/L
CREAT SERPL-MCNC: 0.63 MG/DL
EOSINOPHILS ABSOLUTE: 0.1 /ΜL
EOSINOPHILS RELATIVE PERCENT: 2 %
GFR, ESTIMATED: 96
GLUCOSE BLD-MCNC: 89 MG/DL
HCT VFR BLD CALC: 37.4 % (ref 36–46)
HEMOGLOBIN: 12.1 G/DL (ref 12–16)
LYMPHOCYTES ABSOLUTE: 1.1 /ΜL
LYMPHOCYTES RELATIVE PERCENT: 20 %
MCH RBC QN AUTO: 28.8 PG
MCHC RBC AUTO-ENTMCNC: 32.4 G/DL
MCV RBC AUTO: 89 FL
MONOCYTES ABSOLUTE: 0.6 /ΜL
MONOCYTES RELATIVE PERCENT: 12 %
NEUTROPHILS ABSOLUTE: 3.5 /ΜL
NEUTROPHILS RELATIVE PERCENT: 65 %
PDW BLD-RTO: 12.7 %
PLATELET # BLD: 285 K/ΜL
PMV BLD AUTO: NORMAL FL
POTASSIUM SERPL-SCNC: 4.2 MMOL/L
RBC # BLD: 4.2 10^6/ΜL
SED RATE, AUTOMATED: 2
SODIUM BLD-SCNC: 131 MMOL/L
TOTAL PROTEIN: 6.2 G/DL (ref 6.4–8.2)
VITAMIN D 25-HYDROXY: 79.4
VITAMIN D2, 25 HYDROXY: NORMAL
VITAMIN D3,25 HYDROXY: NORMAL
WBC # BLD: 5.4 10^3/ML

## 2025-06-10 RX ORDER — CEVIMELINE HYDROCHLORIDE 30 MG/1
30 CAPSULE ORAL 3 TIMES DAILY
Qty: 90 CAPSULE | Refills: 5 | OUTPATIENT
Start: 2025-06-10 | End: 2025-12-07

## 2025-06-10 RX ORDER — CEVIMELINE HYDROCHLORIDE 30 MG/1
30 CAPSULE ORAL 3 TIMES DAILY
Qty: 90 CAPSULE | Refills: 5 | Status: SHIPPED | OUTPATIENT
Start: 2025-06-10

## 2025-06-10 NOTE — TELEPHONE ENCOUNTER
Called patient regarding refill request.  She states that after her visit in February she began to experience worsening dry mouth and resumed the medication.  She states that she would like a refill to be called in please.

## 2025-06-10 NOTE — TELEPHONE ENCOUNTER
Jenna Charlton called requesting a refill on the following medications:  Requested Prescriptions     Pending Prescriptions Disp Refills    cevimeline (EVOXAC) 30 MG capsule 90 capsule 5     Sig: Take 1 capsule by mouth 3 times daily     Pharmacy verified:[facility], [facility]. [phone]  .pv      Date of last visit: 02.18.2025  Date of next visit (if applicable): 07.22.2025

## 2025-07-21 ASSESSMENT — ENCOUNTER SYMPTOMS
NAUSEA: 0
VOMITING: 0
COUGH: 0
ABDOMINAL PAIN: 0
SHORTNESS OF BREATH: 0

## 2025-07-21 NOTE — PROGRESS NOTES
[facility] Physicians Rheumatology  Rheumatology Clinic Note      12/14/2022      CHIEF COMPLAINT:    Chief Complaint   Patient presents with    6 Month Follow-Up     Seropositive rheumatoid arthritis (HCC)    Other     Tolerating well.          HISTORY OF PRESENT ILLNESS:    69 y.o. female with seroporitive rheumatoid arthritis (+RF) presents for follow up. She is on Enbrel 50 mg SQ once weekly (started Nov 2022).   Past med: hydroxychloroquine (migraines), methotrexate (d/c as she was doing well)  Evoxac did not help dry mouth.    Overall doing well. No RA flare ups. No joint pain, joint swelling or prolonged morning stiffness.  Main complaint is persistent severe dry mouth. She has restarted Evoxac as it provides little relief.    No oral ulcers.  Has dry eyes. Uses restasis.  No skin rash.      Past Medical History:     has a past medical history of Rheumatoid arthritis (HCC).    Past Surgical History:     has a past surgical history that includes Tonsillectomy and Appendectomy.    Current Medications:      Current Outpatient Medications:     RESTASIS 0.05 % ophthalmic emulsion, , Disp: , Rfl:     cevimeline (EVOXAC) 30 MG capsule, Take 1 capsule by mouth 3 times daily, Disp: 270 capsule, Rfl: 1    etanercept (ENBREL SURECLICK) 50 MG/ML SOAJ, Inject 50 mg into the skin once a week, Disp: 12 mL, Rfl: 1    predniSONE (DELTASONE) 10 MG tablet, Take 3 tablets by mouth daily for 3 days, THEN 2 tablets daily for 3 days, THEN 1 tablet daily for 3 days., Disp: 18 tablet, Rfl: 0    loteprednol (LOTEMAX) 0.5 % ophthalmic suspension, , Disp: , Rfl:     vitamin C (ASCORBIC ACID) 500 MG tablet, Take 1 tablet by mouth 2 times daily, Disp: , Rfl:     magnesium (MAGNESIUM-OXIDE) 250 MG TABS tablet, 1 capsule with food, Disp: , Rfl:     vitamin D 25 MCG (1000 UT) CAPS, Take by mouth daily, Disp: , Rfl:     ibuprofen (ADVIL;MOTRIN) 200 MG tablet, Take 1 tablet by mouth every 6 hours as needed for Pain, Disp: , Rfl:     folic

## 2025-07-22 ENCOUNTER — OFFICE VISIT (OUTPATIENT)
Age: 70
End: 2025-07-22
Payer: MEDICARE

## 2025-07-22 VITALS
OXYGEN SATURATION: 99 % | BODY MASS INDEX: 21.56 KG/M2 | WEIGHT: 126.3 LBS | HEIGHT: 64 IN | HEART RATE: 76 BPM | SYSTOLIC BLOOD PRESSURE: 114 MMHG | DIASTOLIC BLOOD PRESSURE: 72 MMHG

## 2025-07-22 DIAGNOSIS — M35.00 SECONDARY SJOGREN'S SYNDROME: ICD-10-CM

## 2025-07-22 DIAGNOSIS — M05.9 SEROPOSITIVE RHEUMATOID ARTHRITIS (HCC): Primary | ICD-10-CM

## 2025-07-22 PROCEDURE — 1123F ACP DISCUSS/DSCN MKR DOCD: CPT | Performed by: INTERNAL MEDICINE

## 2025-07-22 PROCEDURE — G2211 COMPLEX E/M VISIT ADD ON: HCPCS | Performed by: INTERNAL MEDICINE

## 2025-07-22 PROCEDURE — 1126F AMNT PAIN NOTED NONE PRSNT: CPT | Performed by: INTERNAL MEDICINE

## 2025-07-22 PROCEDURE — 99214 OFFICE O/P EST MOD 30 MIN: CPT | Performed by: INTERNAL MEDICINE

## 2025-07-22 RX ORDER — CEVIMELINE HYDROCHLORIDE 30 MG/1
30 CAPSULE ORAL 3 TIMES DAILY
Qty: 270 CAPSULE | Refills: 1 | Status: SHIPPED | OUTPATIENT
Start: 2025-07-22 | End: 2026-01-18

## 2025-07-22 RX ORDER — CYCLOSPORINE 0.5 MG/ML
EMULSION OPHTHALMIC
COMMUNITY
Start: 2025-06-04

## 2025-07-22 RX ORDER — PREDNISONE 10 MG/1
TABLET ORAL
Qty: 18 TABLET | Refills: 0 | Status: SHIPPED | OUTPATIENT
Start: 2025-07-22 | End: 2025-07-31

## 2025-07-22 RX ORDER — MEDROXYPROGESTERONE ACETATE 150 MG/ML
50 INJECTION, SUSPENSION INTRAMUSCULAR WEEKLY
Qty: 12 ML | Refills: 1 | Status: ACTIVE | OUTPATIENT
Start: 2025-07-22